# Patient Record
Sex: FEMALE | Race: OTHER | NOT HISPANIC OR LATINO | URBAN - METROPOLITAN AREA
[De-identification: names, ages, dates, MRNs, and addresses within clinical notes are randomized per-mention and may not be internally consistent; named-entity substitution may affect disease eponyms.]

---

## 2017-09-08 ENCOUNTER — INPATIENT (INPATIENT)
Facility: HOSPITAL | Age: 52
LOS: 6 days | Discharge: HOME | End: 2017-09-15
Attending: INTERNAL MEDICINE | Admitting: INTERNAL MEDICINE
Payer: MEDICAID

## 2017-09-08 VITALS
TEMPERATURE: 98 F | DIASTOLIC BLOOD PRESSURE: 97 MMHG | SYSTOLIC BLOOD PRESSURE: 133 MMHG | HEART RATE: 83 BPM | RESPIRATION RATE: 16 BRPM | OXYGEN SATURATION: 100 %

## 2017-09-08 DIAGNOSIS — Z29.9 ENCOUNTER FOR PROPHYLACTIC MEASURES, UNSPECIFIED: ICD-10-CM

## 2017-09-08 DIAGNOSIS — R74.8 ABNORMAL LEVELS OF OTHER SERUM ENZYMES: ICD-10-CM

## 2017-09-08 DIAGNOSIS — R06.02 SHORTNESS OF BREATH: ICD-10-CM

## 2017-09-08 DIAGNOSIS — R06.09 OTHER FORMS OF DYSPNEA: ICD-10-CM

## 2017-09-08 LAB
ALBUMIN SERPL ELPH-MCNC: 3.8 G/DL — SIGNIFICANT CHANGE UP (ref 3.3–5)
ALP SERPL-CCNC: 80 U/L — SIGNIFICANT CHANGE UP (ref 40–120)
ALT FLD-CCNC: 46 U/L — HIGH (ref 4–33)
APTT BLD: 36.1 SEC — SIGNIFICANT CHANGE UP (ref 27.5–37.4)
AST SERPL-CCNC: 39 U/L — HIGH (ref 4–32)
B PERT DNA SPEC QL NAA+PROBE: SIGNIFICANT CHANGE UP
BASE EXCESS BLDV CALC-SCNC: 3.5 MMOL/L — SIGNIFICANT CHANGE UP
BASOPHILS # BLD AUTO: 0.03 K/UL — SIGNIFICANT CHANGE UP (ref 0–0.2)
BASOPHILS NFR BLD AUTO: 0.4 % — SIGNIFICANT CHANGE UP (ref 0–2)
BILIRUB SERPL-MCNC: 0.4 MG/DL — SIGNIFICANT CHANGE UP (ref 0.2–1.2)
BLOOD GAS VENOUS - CREATININE: 0.6 MG/DL — SIGNIFICANT CHANGE UP (ref 0.5–1.3)
BUN SERPL-MCNC: 10 MG/DL — SIGNIFICANT CHANGE UP (ref 7–23)
C PNEUM DNA SPEC QL NAA+PROBE: NOT DETECTED — SIGNIFICANT CHANGE UP
CALCIUM SERPL-MCNC: 9.7 MG/DL — SIGNIFICANT CHANGE UP (ref 8.4–10.5)
CHLORIDE BLDV-SCNC: 105 MMOL/L — SIGNIFICANT CHANGE UP (ref 96–108)
CHLORIDE SERPL-SCNC: 102 MMOL/L — SIGNIFICANT CHANGE UP (ref 98–107)
CK MB BLD-MCNC: 4.9 — HIGH (ref 0–2.5)
CK MB BLD-MCNC: 55.11 NG/ML — HIGH (ref 1–4.7)
CK MB BLD-MCNC: 58.93 NG/ML — HIGH (ref 1–4.7)
CK SERPL-CCNC: 1143 U/L — HIGH (ref 25–170)
CK SERPL-CCNC: 1200 U/L — HIGH (ref 25–170)
CO2 SERPL-SCNC: 26 MMOL/L — SIGNIFICANT CHANGE UP (ref 22–31)
CREAT SERPL-MCNC: 0.56 MG/DL — SIGNIFICANT CHANGE UP (ref 0.5–1.3)
EOSINOPHIL # BLD AUTO: 0 K/UL — SIGNIFICANT CHANGE UP (ref 0–0.5)
EOSINOPHIL NFR BLD AUTO: 0 % — SIGNIFICANT CHANGE UP (ref 0–6)
FLUAV H1 2009 PAND RNA SPEC QL NAA+PROBE: NOT DETECTED — SIGNIFICANT CHANGE UP
FLUAV H1 RNA SPEC QL NAA+PROBE: NOT DETECTED — SIGNIFICANT CHANGE UP
FLUAV H3 RNA SPEC QL NAA+PROBE: NOT DETECTED — SIGNIFICANT CHANGE UP
FLUAV SUBTYP SPEC NAA+PROBE: SIGNIFICANT CHANGE UP
FLUBV RNA SPEC QL NAA+PROBE: NOT DETECTED — SIGNIFICANT CHANGE UP
GAS PNL BLDV: 138 MMOL/L — SIGNIFICANT CHANGE UP (ref 136–146)
GLUCOSE BLDV-MCNC: 111 — HIGH (ref 70–99)
GLUCOSE SERPL-MCNC: 102 MG/DL — HIGH (ref 70–99)
HADV DNA SPEC QL NAA+PROBE: NOT DETECTED — SIGNIFICANT CHANGE UP
HCG SERPL-ACNC: < 5 MIU/ML — SIGNIFICANT CHANGE UP
HCO3 BLDV-SCNC: 25 MMOL/L — SIGNIFICANT CHANGE UP (ref 20–27)
HCOV 229E RNA SPEC QL NAA+PROBE: NOT DETECTED — SIGNIFICANT CHANGE UP
HCOV HKU1 RNA SPEC QL NAA+PROBE: NOT DETECTED — SIGNIFICANT CHANGE UP
HCOV NL63 RNA SPEC QL NAA+PROBE: NOT DETECTED — SIGNIFICANT CHANGE UP
HCOV OC43 RNA SPEC QL NAA+PROBE: NOT DETECTED — SIGNIFICANT CHANGE UP
HCT VFR BLD CALC: 45.8 % — HIGH (ref 34.5–45)
HCT VFR BLDV CALC: 46 % — HIGH (ref 34.5–45)
HGB BLD-MCNC: 14.5 G/DL — SIGNIFICANT CHANGE UP (ref 11.5–15.5)
HGB BLDV-MCNC: 15 G/DL — SIGNIFICANT CHANGE UP (ref 11.5–15.5)
HMPV RNA SPEC QL NAA+PROBE: NOT DETECTED — SIGNIFICANT CHANGE UP
HPIV1 RNA SPEC QL NAA+PROBE: NOT DETECTED — SIGNIFICANT CHANGE UP
HPIV2 RNA SPEC QL NAA+PROBE: NOT DETECTED — SIGNIFICANT CHANGE UP
HPIV3 RNA SPEC QL NAA+PROBE: NOT DETECTED — SIGNIFICANT CHANGE UP
HPIV4 RNA SPEC QL NAA+PROBE: NOT DETECTED — SIGNIFICANT CHANGE UP
IMM GRANULOCYTES # BLD AUTO: 0.03 # — SIGNIFICANT CHANGE UP
IMM GRANULOCYTES NFR BLD AUTO: 0.4 % — SIGNIFICANT CHANGE UP (ref 0–1.5)
INR BLD: 1.06 — SIGNIFICANT CHANGE UP (ref 0.88–1.17)
LACTATE BLDV-MCNC: 1.6 MMOL/L — SIGNIFICANT CHANGE UP (ref 0.5–2)
LYMPHOCYTES # BLD AUTO: 1.19 K/UL — SIGNIFICANT CHANGE UP (ref 1–3.3)
LYMPHOCYTES # BLD AUTO: 14.2 % — SIGNIFICANT CHANGE UP (ref 13–44)
M PNEUMO DNA SPEC QL NAA+PROBE: NOT DETECTED — SIGNIFICANT CHANGE UP
MCHC RBC-ENTMCNC: 26.2 PG — LOW (ref 27–34)
MCHC RBC-ENTMCNC: 31.7 % — LOW (ref 32–36)
MCV RBC AUTO: 82.7 FL — SIGNIFICANT CHANGE UP (ref 80–100)
MONOCYTES # BLD AUTO: 0.6 K/UL — SIGNIFICANT CHANGE UP (ref 0–0.9)
MONOCYTES NFR BLD AUTO: 7.1 % — SIGNIFICANT CHANGE UP (ref 2–14)
NEUTROPHILS # BLD AUTO: 6.55 K/UL — SIGNIFICANT CHANGE UP (ref 1.8–7.4)
NEUTROPHILS NFR BLD AUTO: 77.9 % — HIGH (ref 43–77)
NRBC # FLD: 0 — SIGNIFICANT CHANGE UP
NT-PROBNP SERPL-SCNC: 50.39 PG/ML — SIGNIFICANT CHANGE UP
PCO2 BLDV: 56 MMHG — HIGH (ref 41–51)
PH BLDV: 7.33 PH — SIGNIFICANT CHANGE UP (ref 7.32–7.43)
PLATELET # BLD AUTO: 193 K/UL — SIGNIFICANT CHANGE UP (ref 150–400)
PMV BLD: 11 FL — SIGNIFICANT CHANGE UP (ref 7–13)
PO2 BLDV: < 24 MMHG — LOW (ref 35–40)
POTASSIUM BLDV-SCNC: 4.1 MMOL/L — SIGNIFICANT CHANGE UP (ref 3.4–4.5)
POTASSIUM SERPL-MCNC: 4.2 MMOL/L — SIGNIFICANT CHANGE UP (ref 3.5–5.3)
POTASSIUM SERPL-SCNC: 4.2 MMOL/L — SIGNIFICANT CHANGE UP (ref 3.5–5.3)
PROT SERPL-MCNC: 7.7 G/DL — SIGNIFICANT CHANGE UP (ref 6–8.3)
PROTHROM AB SERPL-ACNC: 11.9 SEC — SIGNIFICANT CHANGE UP (ref 9.8–13.1)
RBC # BLD: 5.54 M/UL — HIGH (ref 3.8–5.2)
RBC # FLD: 14.5 % — SIGNIFICANT CHANGE UP (ref 10.3–14.5)
RSV RNA SPEC QL NAA+PROBE: NOT DETECTED — SIGNIFICANT CHANGE UP
RV+EV RNA SPEC QL NAA+PROBE: NOT DETECTED — SIGNIFICANT CHANGE UP
SAO2 % BLDV: 27.4 % — LOW (ref 60–85)
SODIUM SERPL-SCNC: 141 MMOL/L — SIGNIFICANT CHANGE UP (ref 135–145)
TROPONIN T SERPL-MCNC: 0.17 NG/ML — HIGH (ref 0–0.06)
TROPONIN T SERPL-MCNC: 0.19 NG/ML — HIGH (ref 0–0.06)
WBC # BLD: 8.4 K/UL — SIGNIFICANT CHANGE UP (ref 3.8–10.5)
WBC # FLD AUTO: 8.4 K/UL — SIGNIFICANT CHANGE UP (ref 3.8–10.5)

## 2017-09-08 PROCEDURE — 71250 CT THORAX DX C-: CPT | Mod: 26

## 2017-09-08 PROCEDURE — 71020: CPT | Mod: 26

## 2017-09-08 PROCEDURE — 99255 IP/OBS CONSLTJ NEW/EST HI 80: CPT | Mod: GC

## 2017-09-08 RX ORDER — HEPARIN SODIUM 5000 [USP'U]/ML
3800 INJECTION INTRAVENOUS; SUBCUTANEOUS ONCE
Qty: 0 | Refills: 0 | Status: COMPLETED | OUTPATIENT
Start: 2017-09-08 | End: 2017-09-08

## 2017-09-08 RX ORDER — ASPIRIN/CALCIUM CARB/MAGNESIUM 324 MG
81 TABLET ORAL DAILY
Qty: 0 | Refills: 0 | Status: DISCONTINUED | OUTPATIENT
Start: 2017-09-09 | End: 2017-09-15

## 2017-09-08 RX ORDER — HEPARIN SODIUM 5000 [USP'U]/ML
INJECTION INTRAVENOUS; SUBCUTANEOUS
Qty: 25000 | Refills: 0 | Status: DISCONTINUED | OUTPATIENT
Start: 2017-09-08 | End: 2017-09-08

## 2017-09-08 RX ORDER — ASPIRIN/CALCIUM CARB/MAGNESIUM 324 MG
325 TABLET ORAL ONCE
Qty: 0 | Refills: 0 | Status: COMPLETED | OUTPATIENT
Start: 2017-09-08 | End: 2017-09-08

## 2017-09-08 RX ORDER — HEPARIN SODIUM 5000 [USP'U]/ML
3800 INJECTION INTRAVENOUS; SUBCUTANEOUS EVERY 6 HOURS
Qty: 0 | Refills: 0 | Status: DISCONTINUED | OUTPATIENT
Start: 2017-09-08 | End: 2017-09-08

## 2017-09-08 RX ADMIN — Medication 325 MILLIGRAM(S): at 10:50

## 2017-09-08 RX ADMIN — HEPARIN SODIUM 750 UNIT(S)/HR: 5000 INJECTION INTRAVENOUS; SUBCUTANEOUS at 10:50

## 2017-09-08 RX ADMIN — HEPARIN SODIUM 3800 UNIT(S): 5000 INJECTION INTRAVENOUS; SUBCUTANEOUS at 10:50

## 2017-09-08 NOTE — H&P ADULT - NSHPSOCIALHISTORY_GEN_ALL_CORE
Patient lives at home with family, house wife. Denies current or former tobacco use, alcohol consumption, or illicit drug use.

## 2017-09-08 NOTE — H&P ADULT - HISTORY OF PRESENT ILLNESS
51 y/o female presents to the ED with shortness of breath. The patient reports that she has been experiencing worsening PYLE for the past 2 weeks. The PYLE somewhat subsides with rest and is associated with PND and 2-pillow orthopnea. At baseline, the patient has always been able to walk for several blocks without developing symptoms, but can only walk a half block now before feeling SOB. Dyspnea has been accompanied by a persistent cough that is productive of white sputum and worse during exertion. Tussive episodes are painful and cause a dull pain on the left side of her neck and mid-sternal chest that subsides with resolution of coughing. She has also had an intermittent, bitemporal HA for the past 2-3 weeks that is a 9/10 in severity and is worse with tussive episodes. The patient also reports that she has felt a "fluttering sensation" in her mid-sternal chest for the past 2 weeks that has been constant. She notes having a decreased appetite and feeling more fatigued over the past 2 weeks, but denies any sick contacts or recent travel. She does not have a PCP or cardiologist and has not seen a doctor in many years. The patient denies dizziness, numbness/tingling, weakness, syncope, diaphoresis, lower extremity edema, changes in weight, dysuria, melena, BRBPR, abdominal pain, constipation, and N/V/D.

## 2017-09-08 NOTE — H&P ADULT - PROBLEM SELECTOR PLAN 1
Admitted to telemetry. Check serial EKGs, FLP, A1C, and TSH. Heparin gtt. Check Echo. F/U Cardiology note

## 2017-09-08 NOTE — ED PROCEDURE NOTE - PROCEDURE ADDITIONAL DETAILS
nl LV systolic function,   likely grade 1 diastolic dysfunction,  no pericardial or pleural effusion seen,  AV,MV grosly nl,  RV not dilated, TAPSE nl (18)   lung fields 1-2 B lines per field but thickened pleura suggestive of interstitial process

## 2017-09-08 NOTE — H&P ADULT - ASSESSMENT
51 y/o female presents to the ED with shortness of breath is admitted to telemetry for positive cardiac enzymes r/o ACS.

## 2017-09-08 NOTE — H&P ADULT - RS GEN PE MLT RESP DETAILS PC
respirations non-labored/airway patent/no rhonchi/rales/breath sounds equal/good air movement/no wheezes/no chest wall tenderness

## 2017-09-08 NOTE — ED PROVIDER NOTE - OBJECTIVE STATEMENT
51 y/o female with no pmhx nonsmoker presents to ED for SOB and cough x 2 weeks. Endorsing subjective fevers, productive cough with white sputum associated with cramping chest pain only when coughing. Nonpleuritic, nonradiating. Admits to PYLE, cannot walk a block without becoming SOB. No family hx of CAD, MI or PE. Does not have a pcp nor cardiologist. No recent travel or sick contacts. No night sweats, palpitations, abdominal pain, n/v/d, weakness, syncope, numbness, LE edema, calf tenderness, estrogen use, surgeries.

## 2017-09-08 NOTE — H&P ADULT - NEGATIVE MUSCULOSKELETAL SYMPTOMS
no arthritis/no muscle cramps/no myalgia/no neck pain/no arthralgia/no joint swelling/no muscle weakness/no back pain

## 2017-09-08 NOTE — ED PROVIDER NOTE - PROGRESS NOTE DETAILS
GAETANO Jimenez - as per Dr. Corea, bedside sono reveals normal systolic function, mild grade I diastolic dysfunction. GAETANO Jimenez - discussed results with pt, amenable for admission and IV heparin for possible NSTEMI. will admit to tele doc of day given no need for emergent cath. spoke with Dr. Yoder.

## 2017-09-08 NOTE — CONSULT NOTE ADULT - ASSESSMENT
Ms. Manley is a 52 year old woman with no PMHx, presents with worsening PYLE for 2 wks with cough.     Found to have  hands, Gottron's papules, heliotrope rash, with elevation of CK and NSIP pattern on CT chest. 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 The only thing she does not endorse is arthralgias, muscle pain/weakness, polyarthritis. Low suspicion for any cardiac etiology. Even though she is on room air right now, worried that given the acute worsening of her respiratory symptoms, she may be in an exacerbation of her ILD. Possible superimposed infection.     - please KALE, anti-CCP, RF, aldolase, anti-Salima-1, anti-dsDNA, anti-Sm, anti-RNP, scleroderma ab, sjorgen's ab.   - check RVP   - check sputum cx and blood cx  - check legionella   - suggest giving her vancomycin / zosyn after cultures.   - will consider giving empiric steroids if infectious w/u comes back negative.   - consult rheumatologist.   - keep SaO2 > 92%.     follow up the completion of this note    will discuss with the attending Ms. Manley is a 52 year old woman with no PMHx, presents with worsening PYLE for 2 wks with cough.     Found to have  hands, Gottron's papules, heliotrope rash, with elevation of CK and NSIP pattern on CT chest. 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 The only thing she does not endorse is arthralgias, muscle pain/weakness, polyarthritis. Low suspicion for any cardiac etiology. Even though she is on room air right now, worried that given the acute worsening of her respiratory symptoms, she may be in an exacerbation of her ILD. Possible superimposed infection.     - please KALE, anti-CCP, RF, aldolase, anti-Salima-1, anti-dsDNA, anti-Sm, anti-RNP, scleroderma ab, sjorgen's ab.   - check RVP   - check sputum cx and blood cx  - check legionella   - suggest giving her vancomycin / zosyn after cultures.   - will consider giving empiric steroids if infectious w/u comes back negative.   - consult rheumatologist.   - keep SaO2 > 92%.   - discuss with cards to consider stopping the hep gtt, low suspicion for ACS.   - will transfer to [facility] when a bed becomes available.     follow up the completion of this note  will discuss with the attending Ms. Manley is a 52 year old woman with no PMHx, presents with worsening PYLE for 2 wks with cough.     Found to have  hands, Gottron's papules, heliotrope rash, with elevation of CK and NSIP pattern on CT chest. 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 The only thing she does not endorse is arthralgias, muscle pain/weakness, polyarthritis. Low suspicion for any cardiac etiology. Even though she is on room air right now, worried that given the acute worsening of her respiratory symptoms, she may be in an exacerbation of her ILD. Possible superimposed infection.     - please KALE, anti-CCP, RF, aldolase, anti-Salima-1, anti-dsDNA, anti-Sm, anti-RNP, scleroderma ab, sjorgen's ab.   - check RVP   - check sputum cx and blood cx  - check legionella   - start vancomycin / zosyn / azithromycin after cultures.   - will consider giving empiric pulse dose steroids if infectious w/u comes back negative.   - consult rheumatologist.   - keep SaO2 > 92%.   - low suspicion of ACS - would stop the hep gtt.   - will transfer to [facility] when a bed becomes available.     Jed Nowak MD   Pulmonary Fellow

## 2017-09-08 NOTE — CONSULT NOTE ADULT - SUBJECTIVE AND OBJECTIVE BOX
CHIEF COMPLAINT: shortness of breath and cough     HPI: Ms. Manley is a 52 year old woman with no PMHx, presents with shortness of breath for 2 wks.     +cough -- worsening SOB.   +PYLE   +bitemporal HA  +neck pain with coughing  no fevers / chills / N/V    PAST MEDICAL & SURGICAL HISTORY:  No pertinent past medical history  No significant past surgical history      FAMILY HISTORY:  No pertinent family history in first degree relatives      SOCIAL HISTORY:  Smoking: __ packs x ___ years  EtOH Use:  Marital Status:  Occupation:  Recent Travel:  Country of Birth:  Advance Directives:    Allergies  No Known Allergies    HOME MEDICATIONS:    REVIEW OF SYSTEMS:  [x] All other systems negative  [ ] Unable to assess ROS because ________    OBJECTIVE:  ICU Vital Signs Last 24 Hrs  T(C): 36.7 (08 Sep 2017 07:24), Max: 36.7 (08 Sep 2017 06:29)  T(F): 98.1 (08 Sep 2017 07:24), Max: 98.1 (08 Sep 2017 07:24)  HR: 80 (08 Sep 2017 11:30) (79 - 83)  BP: 122/90 (08 Sep 2017 11:30) (116/90 - 133/97)  RR: 22 (08 Sep 2017 11:30) (16 - 22)  SpO2: 100% (08 Sep 2017 11:30) (100% - 100%)    PHYSICAL EXAM:  General:   HEENT:   Lymph Nodes:  Neck:   Respiratory:   Cardiovascular:   Abdomen:   Extremities:   Skin:   Neurological:  Psychiatry:    HOSPITAL MEDICATIONS:  MEDICATIONS  (STANDING):  heparin  Infusion.  Unit(s)/Hr (7.5 mL/Hr) IV Continuous <Continuous>    MEDICATIONS  (PRN):  heparin  Injectable 3800 Unit(s) IV Push every 6 hours PRN For aPTT less than 40      LABS:                        14.5   8.40  )-----------( 193      ( 08 Sep 2017 08:00 )             45.8     09-08    141  |  102  |  10  ----------------------------<  102<H>  4.2   |  26  |  0.56    Ca    9.7      08 Sep 2017 08:00    TPro  7.7  /  Alb  3.8  /  TBili  0.4  /  DBili  x   /  AST  39<H>  /  ALT  46<H>  /  AlkPhos  80  09-08    PT/INR - ( 08 Sep 2017 11:03 )   PT: 11.9 SEC;   INR: 1.06          PTT - ( 08 Sep 2017 11:03 )  PTT:36.1 SEC      Venous Blood Gas:  09-08 @ 08:00  7.33/56/< 24/25/27.4  VBG Lactate: 1.6    RADIOLOGY:  CT Thorax:   1.Interstitial lung disease with traction bronchiectasis more prominent at  the lung bases is suggestive of pulmonary fibrosis. The above findings are  suggestive of nonspecific interstitial pneumonitis (NSIP) pattern.  2. Mediastinal adenopathy secondary to pulmonary fibrosis.  3. 1.8 cm indeterminate hypodense lesion in the left lobe of liver. Further  evaluation with MRI is suggested. CHIEF COMPLAINT: shortness of breath and cough     HPI: Ms. Manley is a 52 year old woman with no PMHx, presents with shortness of breath for 2 wks.     For the last 2 wks her PYLE has significantly worsened. She gets SOB while walking 1/2 block and 1 flight of stairs. Her dyspnea first started approx 6 month ago. But for the last 2 wks, it has progressively worsening. +cough -- productive. +fevers / chills -- but did not measure her temp. During this time, she has also developed a hyperpigmented rash on her knuckles and also a rash around her eyes.     No N/V. She does not work -- stays mostly at home. Lives on the first floor. Never smoked. No sick contacts. She is from [facility], but came her a long time ago. No exposures to any mold or chemicals.     No previous medical hx. Does not see any doctors. Not on any medications.     No family hx of any autoimmune disease or any lung disease.     PAST MEDICAL & SURGICAL HISTORY:  No pertinent past medical history  No significant past surgical history      FAMILY HISTORY:  No pertinent family history in first degree relatives      SOCIAL HISTORY:  Smoking: none  EtOH Use: none  Marital Status:   Occupation: stays at home  Recent Travel: none  Country of Birth: [facility]  Advance Directives: full code    Allergies  No Known Allergies    HOME MEDICATIONS:    REVIEW OF SYSTEMS:  [x] All other systems negative  [ ] Unable to assess ROS because ________    OBJECTIVE:  ICU Vital Signs Last 24 Hrs  T(C): 36.7 (08 Sep 2017 07:24), Max: 36.7 (08 Sep 2017 06:29)  T(F): 98.1 (08 Sep 2017 07:24), Max: 98.1 (08 Sep 2017 07:24)  HR: 80 (08 Sep 2017 11:30) (79 - 83)  BP: 122/90 (08 Sep 2017 11:30) (116/90 - 133/97)  RR: 22 (08 Sep 2017 11:30) (16 - 22)  SpO2: 100% (08 Sep 2017 11:30) (100% - 100%)    PHYSICAL EXAM:  General: in mild respiratory distress. tachypneic to the high 20s - low 30s. pleasant.   HEENT: clear OP.  Neck: no  cervical LAD.   Respiratory: inspiratory crackles posteriorly. no wheezing.   Cardiovascular: nl rate and reg rhythm. nl s1, s2. no m/r/g.  Abdomen: soft. nt. nd. +bs  Extremities: + hands. no clubbing.  Skin: +hyperpigmented rash on her knuckles b/l -- consistent with Gottron's papules. + hands.   Neurological: AOx3.   Psychiatry: none    HOSPITAL MEDICATIONS:  MEDICATIONS  (STANDING):  heparin  Infusion.  Unit(s)/Hr (7.5 mL/Hr) IV Continuous <Continuous>    MEDICATIONS  (PRN):  heparin  Injectable 3800 Unit(s) IV Push every 6 hours PRN For aPTT less than 40      LABS:                        14.5   8.40  )-----------( 193      ( 08 Sep 2017 08:00 )             45.8     09-08    141  |  102  |  10  ----------------------------<  102<H>  4.2   |  26  |  0.56    Ca    9.7      08 Sep 2017 08:00    TPro  7.7  /  Alb  3.8  /  TBili  0.4  /  DBili  x   /  AST  39<H>  /  ALT  46<H>  /  AlkPhos  80  09-08    PT/INR - ( 08 Sep 2017 11:03 )   PT: 11.9 SEC;   INR: 1.06          PTT - ( 08 Sep 2017 11:03 )  PTT:36.1 SEC      Venous Blood Gas:  09-08 @ 08:00  7.33/56/< 24/25/27.4  VBG Lactate: 1.6    CK: 1100    RADIOLOGY:  CT Thorax:   1.Interstitial lung disease with traction bronchiectasis more prominent at  the lung bases is suggestive of pulmonary fibrosis. The above findings are  suggestive of nonspecific interstitial pneumonitis (NSIP) pattern.  2. Mediastinal adenopathy secondary to pulmonary fibrosis.  3. 1.8 cm indeterminate hypodense lesion in the left lobe of liver. Further  evaluation with MRI is suggested.

## 2017-09-08 NOTE — H&P ADULT - ATTENDING COMMENTS
1) Pulmonary fibrosis - possible NSIP, f/u rheumatology work up, f/u pulmonary r/o dermatomyosistis    2) Liver mass - consider GI consult     3) Elevated troponins - unlikely ACS D/C HEPARIN, f/u 2d echo

## 2017-09-08 NOTE — H&P ADULT - NEGATIVE NEUROLOGICAL SYMPTOMS
no transient paralysis/no paresthesias/no weakness/no loss of consciousness/no syncope/no generalized seizures/no vertigo/no loss of sensation

## 2017-09-08 NOTE — H&P ADULT - MUSCULOSKELETAL
details… detailed exam normal strength/no joint swelling/no joint erythema/ROM intact/no calf tenderness/no joint warmth

## 2017-09-08 NOTE — H&P ADULT - NEUROLOGICAL DETAILS
alert and oriented x 3/responds to verbal commands/responds to pain/cranial nerves intact/normal strength/sensation intact

## 2017-09-08 NOTE — ED PROVIDER NOTE - MEDICAL DECISION MAKING DETAILS
51 y/o female with no pmhx nonsmoker presents to ED for SOB and cough x 2 weeks. plan: bedside sono, BNP, CE, hcg, cxr, ekg, rvp

## 2017-09-08 NOTE — ED PROVIDER NOTE - ATTENDING CONTRIBUTION TO CARE
[place]  pt w/o significant PMH  notes 2 weeks of cough productive of white sputum  /subjective fever  progressive PYLE  also notes orthopnea   With PYLE notes pulling sensation in chest  denies recent travel or known environmental exposures  No h/o prior sxs of this type  exam  Pt in no distress at rest  sat upper 90s RA  no JVD noted   card S1S2  no gallop  no significant murmur  abd soft nontender no palpable organomegaly  no LE edema no cyanosis   lungs with dry rales  both bases and anteriorly  dull at bases  Imp  exam most suggestive of interstitial process   obtain full labs  viral panel  CXR  consider CT  admit for further evaluation

## 2017-09-08 NOTE — ED PROVIDER NOTE - ENMT, MLM
Airway patent, Nasal mucosa clear. Mouth with normal mucosa. Throat has no vesicles, no oropharyngeal exudates and uvula is midline. No LAD.

## 2017-09-08 NOTE — ED ADULT TRIAGE NOTE - CHIEF COMPLAINT QUOTE
c.o shortness of breath with exertion x2 weeks, worsening today. also c.o midsternal chest pain. denies pmh

## 2017-09-09 LAB
ALBUMIN SERPL ELPH-MCNC: 3.5 G/DL — SIGNIFICANT CHANGE UP (ref 3.3–5)
ALP SERPL-CCNC: 77 U/L — SIGNIFICANT CHANGE UP (ref 40–120)
ALT FLD-CCNC: 41 U/L — HIGH (ref 4–33)
APPEARANCE UR: CLEAR — SIGNIFICANT CHANGE UP
AST SERPL-CCNC: 36 U/L — HIGH (ref 4–32)
BILIRUB SERPL-MCNC: 0.5 MG/DL — SIGNIFICANT CHANGE UP (ref 0.2–1.2)
BILIRUB UR-MCNC: NEGATIVE — SIGNIFICANT CHANGE UP
BLOOD UR QL VISUAL: NEGATIVE — SIGNIFICANT CHANGE UP
BUN SERPL-MCNC: 12 MG/DL — SIGNIFICANT CHANGE UP (ref 7–23)
CALCIUM SERPL-MCNC: 9.7 MG/DL — SIGNIFICANT CHANGE UP (ref 8.4–10.5)
CHLORIDE SERPL-SCNC: 101 MMOL/L — SIGNIFICANT CHANGE UP (ref 98–107)
CHOLEST SERPL-MCNC: 232 MG/DL — HIGH (ref 120–199)
CO2 SERPL-SCNC: 23 MMOL/L — SIGNIFICANT CHANGE UP (ref 22–31)
COLOR SPEC: SIGNIFICANT CHANGE UP
CREAT SERPL-MCNC: 0.67 MG/DL — SIGNIFICANT CHANGE UP (ref 0.5–1.3)
DSDNA AB FLD-ACNC: <0.2 ZZ — SIGNIFICANT CHANGE UP
ENA JO1 AB SER-ACNC: <0.2 ZZ — SIGNIFICANT CHANGE UP
ENA SS-A AB FLD IA-ACNC: 7.5 ZZ — HIGH
GLUCOSE SERPL-MCNC: 119 MG/DL — HIGH (ref 70–99)
GLUCOSE UR-MCNC: NEGATIVE — SIGNIFICANT CHANGE UP
HBA1C BLD-MCNC: 6.6 % — HIGH (ref 4–5.6)
HBV SURFACE AG SER-ACNC: NEGATIVE — SIGNIFICANT CHANGE UP
HCG UR-SCNC: NEGATIVE — SIGNIFICANT CHANGE UP
HDLC SERPL-MCNC: 27 MG/DL — LOW (ref 45–65)
IGA FLD-MCNC: 306 MG/DL — SIGNIFICANT CHANGE UP (ref 70–400)
IGG FLD-MCNC: 1646 MG/DL — HIGH (ref 700–1600)
IGM SERPL-MCNC: 120 MG/DL — SIGNIFICANT CHANGE UP (ref 40–230)
KETONES UR-MCNC: NEGATIVE — SIGNIFICANT CHANGE UP
LEUKOCYTE ESTERASE UR-ACNC: NEGATIVE — SIGNIFICANT CHANGE UP
LIPID PNL WITH DIRECT LDL SERPL: 149 MG/DL — SIGNIFICANT CHANGE UP
MAGNESIUM SERPL-MCNC: 1.8 MG/DL — SIGNIFICANT CHANGE UP (ref 1.6–2.6)
MUCOUS THREADS # UR AUTO: SIGNIFICANT CHANGE UP
NITRITE UR-MCNC: NEGATIVE — SIGNIFICANT CHANGE UP
PH UR: 6 — SIGNIFICANT CHANGE UP (ref 4.6–8)
PHOSPHATE SERPL-MCNC: 4 MG/DL — SIGNIFICANT CHANGE UP (ref 2.5–4.5)
POTASSIUM SERPL-MCNC: 4.4 MMOL/L — SIGNIFICANT CHANGE UP (ref 3.5–5.3)
POTASSIUM SERPL-SCNC: 4.4 MMOL/L — SIGNIFICANT CHANGE UP (ref 3.5–5.3)
PROT SERPL-MCNC: 7.4 G/DL — SIGNIFICANT CHANGE UP (ref 6–8.3)
PROT UR-MCNC: 2.4 MG/DL — SIGNIFICANT CHANGE UP
PROT UR-MCNC: NEGATIVE — SIGNIFICANT CHANGE UP
RBC CASTS # UR COMP ASSIST: SIGNIFICANT CHANGE UP (ref 0–?)
RHEUMATOID FACT SERPL-ACNC: 9.9 IU/ML — SIGNIFICANT CHANGE UP
SODIUM SERPL-SCNC: 137 MMOL/L — SIGNIFICANT CHANGE UP (ref 135–145)
SP GR SPEC: 1.01 — SIGNIFICANT CHANGE UP (ref 1–1.03)
SQUAMOUS # UR AUTO: SIGNIFICANT CHANGE UP
TRIGL SERPL-MCNC: 502 MG/DL — HIGH (ref 10–149)
UROBILINOGEN FLD QL: NORMAL E.U. — SIGNIFICANT CHANGE UP (ref 0.1–0.2)
WBC UR QL: SIGNIFICANT CHANGE UP (ref 0–?)

## 2017-09-09 PROCEDURE — 99255 IP/OBS CONSLTJ NEW/EST HI 80: CPT | Mod: GC

## 2017-09-09 PROCEDURE — 84166 PROTEIN E-PHORESIS/URINE/CSF: CPT | Mod: 26

## 2017-09-09 PROCEDURE — 99233 SBSQ HOSP IP/OBS HIGH 50: CPT | Mod: GC

## 2017-09-09 RX ORDER — VANCOMYCIN HCL 1 G
1000 VIAL (EA) INTRAVENOUS EVERY 12 HOURS
Qty: 0 | Refills: 0 | Status: DISCONTINUED | OUTPATIENT
Start: 2017-09-10 | End: 2017-09-10

## 2017-09-09 RX ORDER — VANCOMYCIN HCL 1 G
VIAL (EA) INTRAVENOUS
Qty: 0 | Refills: 0 | Status: DISCONTINUED | OUTPATIENT
Start: 2017-09-09 | End: 2017-09-10

## 2017-09-09 RX ORDER — AZITHROMYCIN 500 MG/1
500 TABLET, FILM COATED ORAL DAILY
Qty: 0 | Refills: 0 | Status: DISCONTINUED | OUTPATIENT
Start: 2017-09-09 | End: 2017-09-11

## 2017-09-09 RX ORDER — SIMETHICONE 80 MG/1
80 TABLET, CHEWABLE ORAL EVERY 6 HOURS
Qty: 0 | Refills: 0 | Status: DISCONTINUED | OUTPATIENT
Start: 2017-09-09 | End: 2017-09-15

## 2017-09-09 RX ORDER — ACETAMINOPHEN 500 MG
650 TABLET ORAL EVERY 6 HOURS
Qty: 0 | Refills: 0 | Status: DISCONTINUED | OUTPATIENT
Start: 2017-09-09 | End: 2017-09-15

## 2017-09-09 RX ORDER — VANCOMYCIN HCL 1 G
1000 VIAL (EA) INTRAVENOUS ONCE
Qty: 0 | Refills: 0 | Status: COMPLETED | OUTPATIENT
Start: 2017-09-09 | End: 2017-09-09

## 2017-09-09 RX ORDER — PIPERACILLIN AND TAZOBACTAM 4; .5 G/20ML; G/20ML
3.38 INJECTION, POWDER, LYOPHILIZED, FOR SOLUTION INTRAVENOUS EVERY 8 HOURS
Qty: 0 | Refills: 0 | Status: DISCONTINUED | OUTPATIENT
Start: 2017-09-09 | End: 2017-09-11

## 2017-09-09 RX ADMIN — Medication 250 MILLIGRAM(S): at 22:32

## 2017-09-09 RX ADMIN — Medication 81 MILLIGRAM(S): at 11:07

## 2017-09-09 RX ADMIN — Medication 250 MILLIGRAM(S): at 09:55

## 2017-09-09 RX ADMIN — Medication 50 MILLIGRAM(S): at 18:50

## 2017-09-09 RX ADMIN — PIPERACILLIN AND TAZOBACTAM 25 GRAM(S): 4; .5 INJECTION, POWDER, LYOPHILIZED, FOR SOLUTION INTRAVENOUS at 11:00

## 2017-09-09 RX ADMIN — PIPERACILLIN AND TAZOBACTAM 25 GRAM(S): 4; .5 INJECTION, POWDER, LYOPHILIZED, FOR SOLUTION INTRAVENOUS at 18:50

## 2017-09-09 RX ADMIN — Medication 650 MILLIGRAM(S): at 18:10

## 2017-09-09 RX ADMIN — Medication 250 MILLIGRAM(S): at 10:00

## 2017-09-09 RX ADMIN — SIMETHICONE 80 MILLIGRAM(S): 80 TABLET, CHEWABLE ORAL at 18:50

## 2017-09-09 RX ADMIN — Medication 650 MILLIGRAM(S): at 17:34

## 2017-09-09 RX ADMIN — AZITHROMYCIN 500 MILLIGRAM(S): 500 TABLET, FILM COATED ORAL at 09:55

## 2017-09-09 NOTE — PROGRESS NOTE ADULT - SUBJECTIVE AND OBJECTIVE BOX
CHIEF COMPLAINT:    Interval Events:    REVIEW OF SYSTEMS:  Constitutional:   Eyes:  ENT:  CV:  Resp:  GI:  :  MSK:  Integumentary:  Neurological:  Psychiatric:  Endocrine:  Hematologic/Lymphatic:  Allergic/Immunologic:  [ ] All other systems negative  [ ] Unable to assess ROS because ________    OBJECTIVE:  ICU Vital Signs Last 24 Hrs  T(C): 36.8 (08 Sep 2017 21:14), Max: 37 (08 Sep 2017 18:07)  T(F): 98.3 (08 Sep 2017 21:14), Max: 98.6 (08 Sep 2017 18:07)  HR: 91 (08 Sep 2017 21:14) (79 - 91)  BP: 123/73 (08 Sep 2017 21:14) (105/82 - 137/90)  BP(mean): --  ABP: --  ABP(mean): --  RR: 20 (08 Sep 2017 21:14) (20 - 22)  SpO2: 99% (08 Sep 2017 21:14) (96% - 100%)        CAPILLARY BLOOD GLUCOSE          PHYSICAL EXAM:  General:   HEENT:   Lymph Nodes:  Neck:   Respiratory:   Cardiovascular:   Abdomen:   Extremities:   Skin:   Neurological:  Psychiatry:    HOSPITAL MEDICATIONS:  MEDICATIONS  (STANDING):    MEDICATIONS  (PRN):      LABS:                        14.5   8.40  )-----------( 193      ( 08 Sep 2017 08:00 )             45.8     09-09    137  |  101  |  12  ----------------------------<  119<H>  4.4   |  23  |  0.67    Ca    9.7      09 Sep 2017 06:30  Phos  4.0     09-09  Mg     1.8     09-09    TPro  7.4  /  Alb  3.5  /  TBili  0.5  /  DBili  x   /  AST  36<H>  /  ALT  41<H>  /  AlkPhos  77  09-09    PT/INR - ( 08 Sep 2017 11:03 )   PT: 11.9 SEC;   INR: 1.06          PTT - ( 08 Sep 2017 11:03 )  PTT:36.1 SEC      Venous Blood Gas:  09-08 @ 08:00  7.33/56/< 24/25/27.4  VBG Lactate: 1.6      MICROBIOLOGY:     RADIOLOGY:  [ ] Reviewed and interpreted by me    PULMONARY FUNCTION TESTS:    EKG: CHIEF COMPLAINT: Patient is a 52y old  Female who presents with a chief complaint of Shortness of breath (08 Sep 2017 13:20)    Interval Events:  Admitted overnight    REVIEW OF SYSTEMS:  Constitutional: Feels the same  Eyes:  ENT:  CV: CP resolved  Resp: c/o cough, PYLE  GI:  :  MSK:  Integumentary:  Neurological:  Psychiatric:  Endocrine:  Hematologic/Lymphatic:  Allergic/Immunologic:  [x All other systems negative  [ ] Unable to assess ROS because ________    OBJECTIVE:  ICU Vital Signs Last 24 Hrs  T(C): 36.8 (08 Sep 2017 21:14), Max: 37 (08 Sep 2017 18:07)  T(F): 98.3 (08 Sep 2017 21:14), Max: 98.6 (08 Sep 2017 18:07)  HR: 91 (08 Sep 2017 21:14) (79 - 91)  BP: 123/73 (08 Sep 2017 21:14) (105/82 - 137/90)  BP(mean): --  ABP: --  ABP(mean): --  RR: 20 (08 Sep 2017 21:14) (20 - 22)  SpO2: 99% (08 Sep 2017 21:14) (96% - 100%)      HOSPITAL MEDICATIONS:  MEDICATIONS  (STANDING):    MEDICATIONS  (PRN):      LABS:                        14.5   8.40  )-----------( 193      ( 08 Sep 2017 08:00 )             45.8     09-09    137  |  101  |  12  ----------------------------<  119<H>  4.4   |  23  |  0.67    Ca    9.7      09 Sep 2017 06:30  Phos  4.0     09-09  Mg     1.8     09-09    TPro  7.4  /  Alb  3.5  /  TBili  0.5  /  DBili  x   /  AST  36<H>  /  ALT  41<H>  /  AlkPhos  77  09-09    PT/INR - ( 08 Sep 2017 11:03 )   PT: 11.9 SEC;   INR: 1.06          PTT - ( 08 Sep 2017 11:03 )  PTT:36.1 SEC      Venous Blood Gas:  09-08 @ 08:00  7.33/56/< 24/25/27.4  VBG Lactate: 1.6      MICROBIOLOGY:     RADIOLOGY:  [ ] Reviewed and interpreted by me    PULMONARY FUNCTION TESTS:    EKG:

## 2017-09-09 NOTE — PROGRESS NOTE ADULT - PROBLEM SELECTOR PLAN 1
Admitted to telemetry initially. Transferred to RCU. ACS ruled out. Dr. Alvarado from Cardiology will follow.

## 2017-09-09 NOTE — PROGRESS NOTE ADULT - ATTENDING COMMENTS
heliotrope rash around her eyes   hands  elevated CPK  CT scan suspicious for NSIP  will need rheumatologic work up -- consult pending  Pleuritic CP with cough somewhat better today

## 2017-09-09 NOTE — CONSULT NOTE ADULT - SUBJECTIVE AND OBJECTIVE BOX
ALIS SARAVIA  [id number]    HISTORY OF PRESENT ILLNESS: Rheumatology consulted for the evaluation of ILD in the setting of elevated CK and skin findings    Patient is a 51 yo W, with no known PMH ( hasn't seen a doctor in many years), presented for 6 months history of progressive worsening of dyspnea , markedly worse over the past 2 weeks, that occurs at minimal exertion, associated with non productive cough. Mild chest discomfort after coughing, subjective fever. Reports subjective weight loss.    Patient states that over the past year she noticed darkening of her skin over the knuckles, elbows and around the eyes.  No photosensitivity no other skin lesions.  endorses dry eyes, alopecia  No history of asthma or sinus infection, no hemoptysis, no arthralgias or arthritis, no history of miscarriages  denies any muscle weakness    No personal or familial history of auto-immune disease  Is not up-to-date with age appropriate screening            PAST MEDICAL & SURGICAL HISTORY:  No pertinent past medical history  No significant past surgical history      Review of Systems: as noted above     MEDICATIONS  (STANDING): Heparin, ASA          No Known Allergies          PERTINENT MEDICATION HISTORY: none     SOCIAL HISTORY: denies   OCCUPATION: none   TRAVEL HISTORY: none     FAMILY HISTORY:  No pertinent family history in first degree relatives      Vital Signs Last 24 Hrs  T(C): 36.8 (08 Sep 2017 21:14), Max: 37 (08 Sep 2017 18:07)  T(F): 98.3 (08 Sep 2017 21:14), Max: 98.6 (08 Sep 2017 18:07)  HR: 91 (08 Sep 2017 21:14) (79 - 91)  BP: 123/73 (08 Sep 2017 21:14) (105/82 - 137/90)  BP(mean): --  RR: 20 (08 Sep 2017 21:14) (20 - 22)  SpO2: 99% (08 Sep 2017 21:14) (96% - 100%)    Daily Height in cm: 157.48 (08 Sep 2017 13:20)    Daily     Physical Exam:  General: No apparent distress  HEENT: mild scleral icterus   CVS: +S1/S2, RRR,  Resp: left basilar crackles  GI: Soft, NT/ND +BS  MSK:  no evidence of joint deformities, no synovitis  normal ROM at all joints    uche-ungual hyperpigmentation  pitted nails  no telangiectasias    Neuro: AAOx3  muscle strength UE is 5/5 and LE 4/5   Skin:    hyperpigmented lesions over MCPs ( Gottron's) and over elbows  hyperpigmentation around the eyes  no skin lesions over the neck or back       LABS:                        14.5   8.40  )-----------( 193      ( 08 Sep 2017 08:00 )             45.8     09-09    137  |  101  |  12  ----------------------------<  119<H>  4.4   |  23  |  0.67    Ca    9.7      09 Sep 2017 06:30  Phos  4.0     09-09  Mg     1.8     09-09    TPro  7.4  /  Alb  3.5  /  TBili  0.5  /  DBili  x   /  AST  36 /  ALT  41 /  AlkPhos  77  09-09    CK 1200...1143    RF 9.9    Pending:  KALE  dsDNA  CCP  Sm  Sjogren's  Jo1      RADIOLOGY & ADDITIONAL STUDIES:   CT Chest No Cont (09.08.17 )  Interstitial lung disease with traction bronchiectasis more prominent   at the lung bases is suggestive of pulmonary fibrosis. The above findings   are suggestive of nonspecific interstitial pneumonitis (NSIP) pattern.  2. Mediastinal adenopathy secondary to pulmonary fibrosis.  3. 1.8 cm indeterminate hypodense lesion in the left lobe of liver. ALIS SARAVIA  [id number]    HISTORY OF PRESENT ILLNESS: Rheumatology consulted for the evaluation of ILD in the setting of elevated CK and skin findings    Patient is a 53 yo W, with no known PMH ( hasn't seen a doctor in many years), presented for 6 months history of progressive worsening of dyspnea , markedly worse over the past 2 weeks, that occurs at minimal exertion, associated with non productive cough. Mild chest discomfort after coughing, subjective fever. Reports subjective weight loss.    Patient states that over the past year she noticed darkening of her skin over the knuckles, elbows and around the eyes.  No photosensitivity no other skin lesions.  endorses dry eyes, alopecia  No history of asthma or sinus infection, no hemoptysis, no arthralgias or arthritis, no history of miscarriages  denies any muscle weakness    No personal or familial history of auto-immune disease  Is not up-to-date with age appropriate screening            PAST MEDICAL & SURGICAL HISTORY:  No pertinent past medical history  No significant past surgical history      Review of Systems: as noted above     MEDICATIONS  (STANDING): Heparin, ASA          No Known Allergies          PERTINENT MEDICATION HISTORY: none     SOCIAL HISTORY: denies   OCCUPATION: none   TRAVEL HISTORY: none     FAMILY HISTORY:  No pertinent family history in first degree relatives      Vital Signs Last 24 Hrs  T(C): 36.8 (08 Sep 2017 21:14), Max: 37 (08 Sep 2017 18:07)  T(F): 98.3 (08 Sep 2017 21:14), Max: 98.6 (08 Sep 2017 18:07)  HR: 91 (08 Sep 2017 21:14) (79 - 91)  BP: 123/73 (08 Sep 2017 21:14) (105/82 - 137/90)  BP(mean): --  RR: 20 (08 Sep 2017 21:14) (20 - 22)  SpO2: 99% (08 Sep 2017 21:14) (96% - 100%)    Daily Height in cm: 157.48 (08 Sep 2017 13:20)    Daily     Physical Exam:  General: No apparent distress  HEENT: mild scleral icterus   CVS: +S1/S2, RRR,  Resp: left basilar crackles  GI: Soft, NT/ND +BS  MSK:  no evidence of joint deformities, no synovitis  normal ROM at all joints    uhce-ungual hyperpigmentation  pitted nails  no telangiectasias    Neuro: AAOx3  muscle strength UE is 5/5 and LE 4/5   Skin:    hyperpigmented lesions over MCPs ( Gottron's) and over elbows  hyperpigmentation around the eyes  no skin lesions over the neck or back       LABS:                        14.5   8.40  )-----------( 193      ( 08 Sep 2017 08:00 )             45.8     09-09    137  |  101  |  12  ----------------------------<  119<H>  4.4   |  23  |  0.67    Ca    9.7      09 Sep 2017 06:30  Phos  4.0     09-09  Mg     1.8     09-09    TPro  7.4  /  Alb  3.5  /  TBili  0.5  /  DBili  x   /  AST  36 /  ALT  41 /  AlkPhos  77  09-09    CK 1200...1143    RF 9.9    Pending:  KALE  dsDNA  CCP  Sm  Sjogren's  Jo1  Aldolase  KALE  Scleroderma        RADIOLOGY & ADDITIONAL STUDIES:   CT Chest No Cont (09.08.17 )  Interstitial lung disease with traction bronchiectasis more prominent   at the lung bases is suggestive of pulmonary fibrosis. The above findings   are suggestive of nonspecific interstitial pneumonitis (NSIP) pattern.  2. Mediastinal adenopathy secondary to pulmonary fibrosis.  3. 1.8 cm indeterminate hypodense lesion in the left lobe of liver. ALIS SARAVIA  [id number]    HISTORY OF PRESENT ILLNESS: Rheumatology consulted for the evaluation of ILD in the setting of elevated CK and skin findings    Patient is a 53 yo W, with no known PMH ( hasn't seen a doctor in many years), presented for 6 months history of progressive worsening of dyspnea , markedly worse over the past 2 weeks, that occurs at minimal exertion, associated with non productive cough. Mild chest discomfort after coughing, subjective fever. Reports subjective weight loss.    Patient states that over the past year she noticed darkening of her skin over the knuckles, elbows and around the eyes.  No photosensitivity no other skin lesions.  endorses dry eyes, alopecia  No history of asthma or sinus infection, no hemoptysis, no arthralgias or arthritis, no history of miscarriages  no raynaud' s   denies any muscle weakness    No personal or familial history of auto-immune disease  Is not up-to-date with age appropriate screening            PAST MEDICAL & SURGICAL HISTORY:  No pertinent past medical history  No significant past surgical history      Review of Systems: as noted above     MEDICATIONS  (STANDING): Heparin, ASA          No Known Allergies          PERTINENT MEDICATION HISTORY: none     SOCIAL HISTORY: denies   OCCUPATION: none   TRAVEL HISTORY: none     FAMILY HISTORY:  No pertinent family history in first degree relatives      Vital Signs Last 24 Hrs  T(C): 36.8 (08 Sep 2017 21:14), Max: 37 (08 Sep 2017 18:07)  T(F): 98.3 (08 Sep 2017 21:14), Max: 98.6 (08 Sep 2017 18:07)  HR: 91 (08 Sep 2017 21:14) (79 - 91)  BP: 123/73 (08 Sep 2017 21:14) (105/82 - 137/90)  BP(mean): --  RR: 20 (08 Sep 2017 21:14) (20 - 22)  SpO2: 99% (08 Sep 2017 21:14) (96% - 100%)    Daily Height in cm: 157.48 (08 Sep 2017 13:20)    Daily     Physical Exam:  General: No apparent distress  HEENT: mild scleral icterus   CVS: +S1/S2, RRR,  Resp: left basilar crackles  GI: Soft, NT/ND +BS  MSK:  no evidence of joint deformities, no synovitis  normal ROM at all joints    uche-ungual hyperpigmentation  pitted nails  no telangiectasias    Neuro: AAOx3  muscle strength UE is 5/5 and LE 4/5   Skin:    hyperpigmented lesions over MCPs ( Gottron's papules ) and over elbows  hyperpigmentation around the eyes  no skin lesions over the neck or back       LABS:                        14.5   8.40  )-----------( 193      ( 08 Sep 2017 08:00 )             45.8     09-09    137  |  101  |  12  ----------------------------<  119<H>  4.4   |  23  |  0.67    Ca    9.7      09 Sep 2017 06:30  Phos  4.0     09-09  Mg     1.8     09-09    TPro  7.4  /  Alb  3.5  /  TBili  0.5  /  DBili  x   /  AST  36 /  ALT  41 /  AlkPhos  77  09-09    CK 1200...1143    RF 9.9    Pending:  KALE  dsDNA  CCP  Sm  Sjogren's  Jo1  Aldolase  KALE  Scleroderma        RADIOLOGY & ADDITIONAL STUDIES:   CT Chest No Cont (09.08.17 )  Interstitial lung disease with traction bronchiectasis more prominent   at the lung bases is suggestive of pulmonary fibrosis. The above findings   are suggestive of nonspecific interstitial pneumonitis (NSIP) pattern.  2. Mediastinal adenopathy secondary to pulmonary fibrosis.  3. 1.8 cm indeterminate hypodense lesion in the left lobe of liver.

## 2017-09-09 NOTE — CONSULT NOTE ADULT - ATTENDING COMMENTS
56 year old woman presents with acute on chronic worsening of dyspnea over the last 2 weeks, she states that she has had decreasing exercise tolerance over the past several months    has heliotrope rash around her eyes   hands  elevated CPK  CT scan suspicious for NSIP  will need rheumatologic work up  has dyspnea with minimal exertion   saturation in the mid 90's with nasal cannula  check cultures  start antibiotics  please admit to [facility].
patient seen and examined by me personally with fellow   agree with assessment and plan as above     will continue to follow closely and monitor for response to steroids   consider rapid escalation of tx if no response given the degree of ILD   check ECHO for PAH as well given SOB and the ILD

## 2017-09-09 NOTE — CONSULT NOTE ADULT - ASSESSMENT
51 yo W, with no known PMH, with progressive dyspnea found to have pulmonary fibrosis ( NSIP) along with elevated CK and Gottron's sign on exam    Differential include:  -DM related ILD  a form of "hypomyopathic dermatomyositis" described with elevated CK but w/o clinical evidene of muscle weakness  -anti-synthetase syndrome ( acute presentation, systemic signs of fever and weight loss, ILD )     Recommend:  -   - serology ( please add   - malignancy work up (as a trigger for DM presentation) should be pursued    Will follow with you      Kristina Alan MD   Rheumatology Fellow  Pager: [phone] 53 yo W, with no known PMH, with progressive dyspnea found to have pulmonary fibrosis ( NSIP) along with elevated CK and Gottron's sign on exam    Differential include:  -DM related ILD  a form of "hypomyopathic dermatomyositis" described with elevated CK but w/o clinical evidene of muscle weakness  -anti-synthetase syndrome ( acute presentation, systemic signs of fever and weight loss, ILD )     Recommend:  - solumedrol 50 mg IV od for now  - serology ( please add myomarker panel as sent out)   - malignancy work up (as a trigger for DM presentation) should be pursued ( CT A/P)  - work up for dysphagia     Will follow with you      Kristina Alan MD   Rheumatology Fellow  Pager: [phone] 53 yo W, with no known PMH, with progressive dyspnea found to have pulmonary fibrosis ( NSIP) along with elevated CK and Gottron's sign on exam    Differential include:  -DM related ILD  a form of "hypomyopathic dermatomyositis" described with elevated CK but w/o clinical evidene of muscle weakness  -anti-synthetase syndrome ( acute presentation, systemic signs of fever and weight loss, ILD )   anti - MDa5 disease   Recommend:  - solumedrol 50 mg IV od for now, consider IVIG vs Rituxan in ner future    - serology ( please add myomarker panel as sent out)   - malignancy work up (as a trigger for DM presentation) should be pursued ( CT A/P)  - work up for dysphagia     Will follow with you      Kristina Alan MD   Rheumatology Fellow  Pager: [phone]

## 2017-09-10 DIAGNOSIS — R13.10 DYSPHAGIA, UNSPECIFIED: ICD-10-CM

## 2017-09-10 DIAGNOSIS — M33.91 DERMATOPOLYMYOSITIS, UNSPECIFIED WITH RESPIRATORY INVOLVEMENT: ICD-10-CM

## 2017-09-10 LAB
BUN SERPL-MCNC: 14 MG/DL — SIGNIFICANT CHANGE UP (ref 7–23)
CALCIUM SERPL-MCNC: 10.2 MG/DL — SIGNIFICANT CHANGE UP (ref 8.4–10.5)
CHLORIDE SERPL-SCNC: 102 MMOL/L — SIGNIFICANT CHANGE UP (ref 98–107)
CO2 SERPL-SCNC: 20 MMOL/L — LOW (ref 22–31)
CREAT SERPL-MCNC: 0.65 MG/DL — SIGNIFICANT CHANGE UP (ref 0.5–1.3)
ENA RNP IGG SER-ACNC: < 0.2 ZZ — SIGNIFICANT CHANGE UP
ENA SM AB TITR SER: < 0.2 ZZ — SIGNIFICANT CHANGE UP
GLUCOSE SERPL-MCNC: 186 MG/DL — HIGH (ref 70–99)
HBV SURFACE AB SER-ACNC: NONREACTIVE — SIGNIFICANT CHANGE UP
HCT VFR BLD CALC: 47.4 % — HIGH (ref 34.5–45)
HCV AB S/CO SERPL IA: 0.12 S/CO — SIGNIFICANT CHANGE UP
HCV AB SERPL-IMP: SIGNIFICANT CHANGE UP
HGB BLD-MCNC: 14.9 G/DL — SIGNIFICANT CHANGE UP (ref 11.5–15.5)
L PNEUMO AG UR QL: NEGATIVE — SIGNIFICANT CHANGE UP
MCHC RBC-ENTMCNC: 25.9 PG — LOW (ref 27–34)
MCHC RBC-ENTMCNC: 31.4 % — LOW (ref 32–36)
MCV RBC AUTO: 82.3 FL — SIGNIFICANT CHANGE UP (ref 80–100)
NRBC # FLD: 0 — SIGNIFICANT CHANGE UP
PLATELET # BLD AUTO: 215 K/UL — SIGNIFICANT CHANGE UP (ref 150–400)
PMV BLD: 10.6 FL — SIGNIFICANT CHANGE UP (ref 7–13)
POTASSIUM SERPL-MCNC: 5.8 MMOL/L — HIGH (ref 3.5–5.3)
POTASSIUM SERPL-SCNC: 5.8 MMOL/L — HIGH (ref 3.5–5.3)
PROT SERPL-MCNC: 8.6 G/DL — HIGH (ref 6–8.3)
RBC # BLD: 5.76 M/UL — HIGH (ref 3.8–5.2)
RBC # FLD: 14.2 % — SIGNIFICANT CHANGE UP (ref 10.3–14.5)
SODIUM SERPL-SCNC: 140 MMOL/L — SIGNIFICANT CHANGE UP (ref 135–145)
SPECIMEN SOURCE: SIGNIFICANT CHANGE UP
SPECIMEN SOURCE: SIGNIFICANT CHANGE UP
VANCOMYCIN TROUGH SERPL-MCNC: 9.3 UG/ML — LOW (ref 10–20)
WBC # BLD: 10.86 K/UL — HIGH (ref 3.8–10.5)
WBC # FLD AUTO: 10.86 K/UL — HIGH (ref 3.8–10.5)

## 2017-09-10 PROCEDURE — 99233 SBSQ HOSP IP/OBS HIGH 50: CPT | Mod: GC

## 2017-09-10 PROCEDURE — 74177 CT ABD & PELVIS W/CONTRAST: CPT | Mod: 26

## 2017-09-10 PROCEDURE — 84165 PROTEIN E-PHORESIS SERUM: CPT | Mod: 26

## 2017-09-10 RX ORDER — VANCOMYCIN HCL 1 G
1250 VIAL (EA) INTRAVENOUS EVERY 12 HOURS
Qty: 0 | Refills: 0 | Status: DISCONTINUED | OUTPATIENT
Start: 2017-09-10 | End: 2017-09-11

## 2017-09-10 RX ADMIN — AZITHROMYCIN 500 MILLIGRAM(S): 500 TABLET, FILM COATED ORAL at 11:22

## 2017-09-10 RX ADMIN — PIPERACILLIN AND TAZOBACTAM 25 GRAM(S): 4; .5 INJECTION, POWDER, LYOPHILIZED, FOR SOLUTION INTRAVENOUS at 11:14

## 2017-09-10 RX ADMIN — PIPERACILLIN AND TAZOBACTAM 25 GRAM(S): 4; .5 INJECTION, POWDER, LYOPHILIZED, FOR SOLUTION INTRAVENOUS at 03:58

## 2017-09-10 RX ADMIN — Medication 81 MILLIGRAM(S): at 11:22

## 2017-09-10 RX ADMIN — PIPERACILLIN AND TAZOBACTAM 25 GRAM(S): 4; .5 INJECTION, POWDER, LYOPHILIZED, FOR SOLUTION INTRAVENOUS at 18:17

## 2017-09-10 RX ADMIN — Medication 250 MILLIGRAM(S): at 10:08

## 2017-09-10 RX ADMIN — Medication 50 MILLIGRAM(S): at 05:29

## 2017-09-10 RX ADMIN — Medication 166.67 MILLIGRAM(S): at 22:39

## 2017-09-10 NOTE — PROGRESS NOTE ADULT - ASSESSMENT
1) Pulmonary fibrosis - possible NSIP, f/u rheumatology work up, f/u pulmonary r/o dermatomyosistis vs anti synthetase syndrome, CT a/p pending to r/o malignancy    2) Liver mass - consider GI consult , MRI abd    3) Elevated troponins - unlikely ACS D/C HEPARIN, f/u 2d echo

## 2017-09-10 NOTE — PROGRESS NOTE ADULT - SUBJECTIVE AND OBJECTIVE BOX
CHIEF COMPLAINT:    Interval Events:    REVIEW OF SYSTEMS:  Constitutional:   Eyes:  ENT:  CV:  Resp:  GI:  :  MSK:  Integumentary:  Neurological:  Psychiatric:  Endocrine:  Hematologic/Lymphatic:  Allergic/Immunologic:  [ ] All other systems negative  [ ] Unable to assess ROS because ________    OBJECTIVE:  ICU Vital Signs Last 24 Hrs  T(C): 36.6 (10 Sep 2017 05:29), Max: 36.9 (09 Sep 2017 21:43)  T(F): 97.9 (10 Sep 2017 05:29), Max: 98.4 (09 Sep 2017 21:43)  HR: 90 (10 Sep 2017 05:29) (84 - 90)  BP: 110/78 (10 Sep 2017 05:29) (105/71 - 111/82)  BP(mean): --  ABP: --  ABP(mean): --  RR: 18 (10 Sep 2017 05:29) (18 - 20)  SpO2: 99% (10 Sep 2017 05:29) (98% - 99%)        CAPILLARY BLOOD GLUCOSE          PHYSICAL EXAM:  General:   HEENT:   Lymph Nodes:  Neck:   Respiratory:   Cardiovascular:   Abdomen:   Extremities:   Skin:   Neurological:  Psychiatry:    HOSPITAL MEDICATIONS:  MEDICATIONS  (STANDING):  aspirin enteric coated 81 milliGRAM(s) Oral daily  methylPREDNISolone sodium succinate Injectable 50 milliGRAM(s) IV Push daily  piperacillin/tazobactam IVPB. 3.375 Gram(s) IV Intermittent every 8 hours  azithromycin   Tablet 500 milliGRAM(s) Oral daily  vancomycin  IVPB      vancomycin  IVPB 1000 milliGRAM(s) IV Intermittent every 12 hours    MEDICATIONS  (PRN):  acetaminophen   Tablet. 650 milliGRAM(s) Oral every 6 hours PRN Mild Pain (1 - 3)  simethicone 80 milliGRAM(s) Chew every 6 hours PRN Gas      LABS:                        14.9   10.86 )-----------( 215      ( 10 Sep 2017 06:40 )             47.4     09-10    140  |  102  |  14  ----------------------------<  186<H>  5.8<H>   |  20<L>  |  0.65    Ca    10.2      10 Sep 2017 06:40  Phos  4.0     09-09  Mg     1.8     09-09    TPro  8.6<H>  /  Alb  x   /  TBili  x   /  DBili  x   /  AST  x   /  ALT  x   /  AlkPhos  x   09-10    PT/INR - ( 08 Sep 2017 11:03 )   PT: 11.9 SEC;   INR: 1.06          PTT - ( 08 Sep 2017 11:03 )  PTT:36.1 SEC  Urinalysis Basic - ( 09 Sep 2017 18:35 )    Color: PLYEL / Appearance: CLEAR / S.007 / pH: 6.0  Gluc: NEGATIVE / Ketone: NEGATIVE  / Bili: NEGATIVE / Urobili: NORMAL E.U.   Blood: NEGATIVE / Protein: NEGATIVE / Nitrite: NEGATIVE   Leuk Esterase: NEGATIVE / RBC: 0-2 / WBC 0-2   Sq Epi: OCC / Non Sq Epi: x / Bacteria: x            MICROBIOLOGY:     RADIOLOGY:  [ ] Reviewed and interpreted by me    PULMONARY FUNCTION TESTS:    EKG: CHIEF COMPLAINT: Patient is a 52y old  Female who presents with a chief complaint of Shortness of breath (08 Sep 2017 13:20)    Interval Events: No acute events overnight    REVIEW OF SYSTEMS:  Constitutional: Feels well except for cough, SOB  Eyes:  ENT:  CV: Denies CP  Resp: c/o cough, sob  GI: s/o difficulty swallowing at times  :  MSK: c/o muscle weakness  Integumentary:  Neurological:  Psychiatric:  Endocrine:  Hematologic/Lymphatic:  Allergic/Immunologic:  [x] All other systems negative  [ ] Unable to assess ROS because ________    OBJECTIVE:  ICU Vital Signs Last 24 Hrs  T(C): 36.6 (10 Sep 2017 05:29), Max: 36.9 (09 Sep 2017 21:43)  T(F): 97.9 (10 Sep 2017 05:29), Max: 98.4 (09 Sep 2017 21:43)  HR: 90 (10 Sep 2017 05:29) (84 - 90)  BP: 110/78 (10 Sep 2017 05:29) (105/71 - 111/82)  BP(mean): --  ABP: --  ABP(mean): --  RR: 18 (10 Sep 2017 05:29) (18 - 20)  SpO2: 99% (10 Sep 2017 05:29) (98% - 99%)        CAPILLARY BLOOD GLUCOSE      HOSPITAL MEDICATIONS:  MEDICATIONS  (STANDING):  aspirin enteric coated 81 milliGRAM(s) Oral daily  methylPREDNISolone sodium succinate Injectable 50 milliGRAM(s) IV Push daily  piperacillin/tazobactam IVPB. 3.375 Gram(s) IV Intermittent every 8 hours  azithromycin   Tablet 500 milliGRAM(s) Oral daily  vancomycin  IVPB      vancomycin  IVPB 1000 milliGRAM(s) IV Intermittent every 12 hours    MEDICATIONS  (PRN):  acetaminophen   Tablet. 650 milliGRAM(s) Oral every 6 hours PRN Mild Pain (1 - 3)  simethicone 80 milliGRAM(s) Chew every 6 hours PRN Gas      LABS:                        14.9   10.86 )-----------( 215      ( 10 Sep 2017 06:40 )             47.4     09-10    140  |  102  |  14  ----------------------------<  186<H>  5.8<H>   |  20<L>  |  0.65    Ca    10.2      10 Sep 2017 06:40  Phos  4.0       Mg     1.8         TPro  8.6<H>  /  Alb  x   /  TBili  x   /  DBili  x   /  AST  x   /  ALT  x   /  AlkPhos  x   09-10    PT/INR - ( 08 Sep 2017 11:03 )   PT: 11.9 SEC;   INR: 1.06          PTT - ( 08 Sep 2017 11:03 )  PTT:36.1 SEC  Urinalysis Basic - ( 09 Sep 2017 18:35 )    Color: PLYEL / Appearance: CLEAR / S.007 / pH: 6.0  Gluc: NEGATIVE / Ketone: NEGATIVE  / Bili: NEGATIVE / Urobili: NORMAL E.U.   Blood: NEGATIVE / Protein: NEGATIVE / Nitrite: NEGATIVE   Leuk Esterase: NEGATIVE / RBC: 0-2 / WBC 0-2   Sq Epi: OCC / Non Sq Epi: x / Bacteria: x            MICROBIOLOGY:     RADIOLOGY:  [ ] Reviewed and interpreted by me    PULMONARY FUNCTION TESTS:    EKG:

## 2017-09-10 NOTE — PROGRESS NOTE ADULT - SUBJECTIVE AND OBJECTIVE BOX
INTERVAL HISTORY: pt is lying in bed comfortable, SOB on exertion, no chest pains  	  MEDICATIONS:  aspirin enteric coated 81 milliGRAM(s) Oral daily    piperacillin/tazobactam IVPB. 3.375 Gram(s) IV Intermittent every 8 hours  azithromycin   Tablet 500 milliGRAM(s) Oral daily  vancomycin  IVPB      vancomycin  IVPB 1000 milliGRAM(s) IV Intermittent every 12 hours      acetaminophen   Tablet. 650 milliGRAM(s) Oral every 6 hours PRN    simethicone 80 milliGRAM(s) Chew every 6 hours PRN    methylPREDNISolone sodium succinate Injectable 50 milliGRAM(s) IV Push daily        PHYSICAL EXAM:  T(C): 36.6 (09-10-17 @ 05:29), Max: 36.9 (09-09-17 @ 21:43)  HR: 90 (09-10-17 @ 05:29) (84 - 90)  BP: 110/78 (09-10-17 @ 05:29) (105/71 - 111/82)  RR: 18 (09-10-17 @ 05:29) (18 - 20)  SpO2: 99% (09-10-17 @ 05:29) (98% - 99%)  Wt(kg): --  I&O's Summary        Appearance: Normal	  HEENT:   heliotropic rash  Cardiovascular: Normal S1 S2, No JVD, No murmurs, No edema  Respiratory: b/l crackles  Gastrointestinal:  Soft, Non-tender, + BS	  Extremities: gottron's papules                                    14.9   10.86 )-----------( 215      ( 10 Sep 2017 06:40 )             47.4     09-10    140  |  102  |  14  ----------------------------<  186<H>  5.8<H>   |  20<L>  |  0.65    Ca    10.2      10 Sep 2017 06:40  Phos  4.0     09-09  Mg     1.8     09-09    TPro  8.6<H>  /  Alb  x   /  TBili  x   /  DBili  x   /  AST  x   /  ALT  x   /  AlkPhos  x   09-10    proBNP:   Lipid Profile:   HgA1c:   TSH:

## 2017-09-10 NOTE — PROGRESS NOTE ADULT - ATTENDING COMMENTS
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

## 2017-09-11 LAB
BUN SERPL-MCNC: 16 MG/DL — SIGNIFICANT CHANGE UP (ref 7–23)
CALCIUM SERPL-MCNC: 9.7 MG/DL — SIGNIFICANT CHANGE UP (ref 8.4–10.5)
CHLORIDE SERPL-SCNC: 99 MMOL/L — SIGNIFICANT CHANGE UP (ref 98–107)
CO2 SERPL-SCNC: 26 MMOL/L — SIGNIFICANT CHANGE UP (ref 22–31)
CREAT SERPL-MCNC: 0.83 MG/DL — SIGNIFICANT CHANGE UP (ref 0.5–1.3)
GLUCOSE SERPL-MCNC: 120 MG/DL — HIGH (ref 70–99)
HCT VFR BLD CALC: 46.1 % — HIGH (ref 34.5–45)
HGB BLD-MCNC: 14.8 G/DL — SIGNIFICANT CHANGE UP (ref 11.5–15.5)
MCHC RBC-ENTMCNC: 26.6 PG — LOW (ref 27–34)
MCHC RBC-ENTMCNC: 32.1 % — SIGNIFICANT CHANGE UP (ref 32–36)
MCV RBC AUTO: 82.8 FL — SIGNIFICANT CHANGE UP (ref 80–100)
NRBC # FLD: 0 — SIGNIFICANT CHANGE UP
PLATELET # BLD AUTO: 204 K/UL — SIGNIFICANT CHANGE UP (ref 150–400)
PMV BLD: 11 FL — SIGNIFICANT CHANGE UP (ref 7–13)
POTASSIUM SERPL-MCNC: 4.1 MMOL/L — SIGNIFICANT CHANGE UP (ref 3.5–5.3)
POTASSIUM SERPL-SCNC: 4.1 MMOL/L — SIGNIFICANT CHANGE UP (ref 3.5–5.3)
RBC # BLD: 5.57 M/UL — HIGH (ref 3.8–5.2)
RBC # FLD: 14.5 % — SIGNIFICANT CHANGE UP (ref 10.3–14.5)
SMOOTH MUSCLE AB SER-ACNC: SIGNIFICANT CHANGE UP
SODIUM SERPL-SCNC: 139 MMOL/L — SIGNIFICANT CHANGE UP (ref 135–145)
WBC # BLD: 12.08 K/UL — HIGH (ref 3.8–10.5)
WBC # FLD AUTO: 12.08 K/UL — HIGH (ref 3.8–10.5)

## 2017-09-11 PROCEDURE — 74230 X-RAY XM SWLNG FUNCJ C+: CPT | Mod: 26

## 2017-09-11 PROCEDURE — 93306 TTE W/DOPPLER COMPLETE: CPT | Mod: 26

## 2017-09-11 PROCEDURE — 99233 SBSQ HOSP IP/OBS HIGH 50: CPT | Mod: GC

## 2017-09-11 RX ADMIN — Medication 81 MILLIGRAM(S): at 13:29

## 2017-09-11 RX ADMIN — PIPERACILLIN AND TAZOBACTAM 25 GRAM(S): 4; .5 INJECTION, POWDER, LYOPHILIZED, FOR SOLUTION INTRAVENOUS at 03:04

## 2017-09-11 RX ADMIN — Medication 1 TABLET(S): at 21:26

## 2017-09-11 RX ADMIN — Medication 50 MILLIGRAM(S): at 05:56

## 2017-09-11 RX ADMIN — Medication 100 MILLIGRAM(S): at 20:31

## 2017-09-11 NOTE — PROGRESS NOTE ADULT - SUBJECTIVE AND OBJECTIVE BOX
INTERVAL HISTORY:  	  MEDICATIONS:  aspirin enteric coated 81 milliGRAM(s) Oral daily        acetaminophen   Tablet. 650 milliGRAM(s) Oral every 6 hours PRN    simethicone 80 milliGRAM(s) Chew every 6 hours PRN    methylPREDNISolone sodium succinate Injectable 50 milliGRAM(s) IV Push daily        PHYSICAL EXAM:  T(C): 36.8 (09-11-17 @ 13:30), Max: 36.8 (09-11-17 @ 05:55)  HR: 100 (09-11-17 @ 13:30) (78 - 100)  BP: 127/88 (09-11-17 @ 13:30) (100/63 - 127/88)  RR: 17 (09-11-17 @ 13:30) (17 - 18)  SpO2: 100% (09-11-17 @ 13:30) (98% - 100%)  Wt(kg): --  I&O's Summary        Appearance: Normal	  HEENT:   Normal oral mucosa, PERRL, EOMI	  Cardiovascular: Normal S1 S2, No JVD, No murmurs, No edema  Respiratory: Lungs clear to auscultation	  Gastrointestinal:  Soft, Non-tender, + BS	  Extremities: Normal range of motion, No clubbing, cyanosis or edema                                    14.8   12.08 )-----------( 204      ( 11 Sep 2017 06:51 )             46.1     09-11    139  |  99  |  16  ----------------------------<  120<H>  4.1   |  26  |  0.83    Ca    9.7      11 Sep 2017 06:51    TPro  8.6<H>  /  Alb  x   /  TBili  x   /  DBili  x   /  AST  x   /  ALT  x   /  AlkPhos  x   09-10    proBNP:   Lipid Profile:   HgA1c:   TSH: INTERVAL HISTORY: pt is lying in bed comfortable, not in distress, no cp   	  MEDICATIONS:  aspirin enteric coated 81 milliGRAM(s) Oral daily        acetaminophen   Tablet. 650 milliGRAM(s) Oral every 6 hours PRN    simethicone 80 milliGRAM(s) Chew every 6 hours PRN    methylPREDNISolone sodium succinate Injectable 50 milliGRAM(s) IV Push daily        PHYSICAL EXAM:  T(C): 36.8 (09-11-17 @ 13:30), Max: 36.8 (09-11-17 @ 05:55)  HR: 100 (09-11-17 @ 13:30) (78 - 100)  BP: 127/88 (09-11-17 @ 13:30) (100/63 - 127/88)  RR: 17 (09-11-17 @ 13:30) (17 - 18)  SpO2: 100% (09-11-17 @ 13:30) (98% - 100%)  Wt(kg): --  I&O's Summary        Appearance: Normal	  HEENT:   heliotropic rash	  Cardiovascular: Normal S1 S2, No JVD, No murmurs, No edema  Respiratory: bibasilar crackles  Gastrointestinal:  Soft, Non-tender, + BS	  Extremities: gottron papules                                    14.8   12.08 )-----------( 204      ( 11 Sep 2017 06:51 )             46.1     09-11    139  |  99  |  16  ----------------------------<  120<H>  4.1   |  26  |  0.83    Ca    9.7      11 Sep 2017 06:51    TPro  8.6<H>  /  Alb  x   /  TBili  x   /  DBili  x   /  AST  x   /  ALT  x   /  AlkPhos  x   09-10    proBNP:   Lipid Profile:   HgA1c:   TSH:

## 2017-09-11 NOTE — SWALLOW VFSS/MBS ASSESSMENT ADULT - NS SWALLOW VFSS REC ASPIR MON
fever/pneumonia/cough/throat clearing/change of breathing pattern/oral hygiene/position upright (90Y)/gurgly voice/upper respiratory infection

## 2017-09-11 NOTE — SWALLOW VFSS/MBS ASSESSMENT ADULT - COMMENTS
The patient was received in the radiology suite this AM, at which time she was alert and cooperative. Patient is reporting pharyngeal stasis on the L side. Per charting, the patient is a 51 y/o F who presented to [facility] with "PYLE x 2 weeks, cough/ CP, on Tele for elevated CE's started on a Heparin gtt." Recent CT of the chest revealed, "Interstitial lung disease with traction bronchiectasis more prominent at the lung bases is suggestive of pulmonary fibrosis. The above findings are suggestive of nonspecific interstitial pneumonitis (NSIP) pattern." Discussed results and recommendations from this evaluation with the patient and call out to MD. The patient was received in the radiology suite this AM, at which time she was alert and cooperative. Patient is reporting pharyngeal stasis on the L side for ~1 month. In addition, patient reporting decreased PO intake. Per charting, the patient is a 53 y/o F who presented to [facility] with "PYLE x 2 weeks, cough/ CP, on Tele for elevated CE's started on a Heparin gtt." Recent CT of the chest revealed, "Interstitial lung disease with traction bronchiectasis more prominent at the lung bases is suggestive of pulmonary fibrosis. The above findings are suggestive of nonspecific interstitial pneumonitis (NSIP) pattern." Discussed results and recommendations from this evaluation with the patient and call out to MD. The patient was received in the radiology suite this AM, at which time she was alert and cooperative. Patient is reporting pharyngeal stasis on the L side for ~1 month. In addition, patient reporting decreased PO intake and changes in her vocal quality. Per charting, the patient is a 51 y/o F who presented to [facility] with "PYLE x 2 weeks, cough/ CP, on Tele for elevated CE's started on a Heparin gtt." Recent CT of the chest revealed, "Interstitial lung disease with traction bronchiectasis more prominent at the lung bases is suggestive of pulmonary fibrosis. The above findings are suggestive of nonspecific interstitial pneumonitis (NSIP) pattern." Discussed results and recommendations from this evaluation with the patient and call out to MD.

## 2017-09-11 NOTE — SWALLOW VFSS/MBS ASSESSMENT ADULT - ADDITIONAL RECOMMENDATIONS
This department to follow during this admission as schedule permits. This department to follow during this admission for swallowing therapy as schedule permits. Continued swallowing therapy upon discharge from [facility] is recommended (rehab vs home care vs [facility] outpatient clinic - [phone]).

## 2017-09-11 NOTE — PROGRESS NOTE ADULT - PROBLEM SELECTOR PLAN 4
-Cinesophagram completed. May continue with regular diet  -Speech and swallow will f/u with exercises

## 2017-09-11 NOTE — PROGRESS NOTE ADULT - SUBJECTIVE AND OBJECTIVE BOX
CHIEF COMPLAINT:    Interval Events:    REVIEW OF SYSTEMS:  Constitutional:   Eyes:  ENT:  CV:  Resp:  GI:  :  MSK:  Integumentary:  Neurological:  Psychiatric:  Endocrine:  Hematologic/Lymphatic:  Allergic/Immunologic:  [ ] All other systems negative  [ ] Unable to assess ROS because ________    OBJECTIVE:  ICU Vital Signs Last 24 Hrs  T(C): 36.8 (11 Sep 2017 05:55), Max: 36.8 (11 Sep 2017 05:55)  T(F): 98.3 (11 Sep 2017 05:55), Max: 98.3 (11 Sep 2017 05:55)  HR: 78 (10 Sep 2017 22:38) (78 - 95)  BP: 100/63 (11 Sep 2017 05:55) (100/63 - 125/81)  BP(mean): --  ABP: --  ABP(mean): --  RR: 18 (11 Sep 2017 05:55) (18 - 18)  SpO2: 99% (11 Sep 2017 05:55) (98% - 99%)        CAPILLARY BLOOD GLUCOSE          PHYSICAL EXAM:  General:   HEENT:   Lymph Nodes:  Neck:   Respiratory:   Cardiovascular:   Abdomen:   Extremities:   Skin:   Neurological:  Psychiatry:    HOSPITAL MEDICATIONS:  MEDICATIONS  (STANDING):  aspirin enteric coated 81 milliGRAM(s) Oral daily  methylPREDNISolone sodium succinate Injectable 50 milliGRAM(s) IV Push daily  piperacillin/tazobactam IVPB. 3.375 Gram(s) IV Intermittent every 8 hours  azithromycin   Tablet 500 milliGRAM(s) Oral daily  vancomycin  IVPB 1250 milliGRAM(s) IV Intermittent every 12 hours    MEDICATIONS  (PRN):  acetaminophen   Tablet. 650 milliGRAM(s) Oral every 6 hours PRN Mild Pain (1 - 3)  simethicone 80 milliGRAM(s) Chew every 6 hours PRN Gas      LABS:                        14.8   12.08 )-----------( 204      ( 11 Sep 2017 06:51 )             46.1     09-    139  |  99  |  16  ----------------------------<  120<H>  4.1   |  26  |  0.83    Ca    9.7      11 Sep 2017 06:51    TPro  8.6<H>  /  Alb  x   /  TBili  x   /  DBili  x   /  AST  x   /  ALT  x   /  AlkPhos  x   09-10      Urinalysis Basic - ( 09 Sep 2017 18:35 )    Color: PLYEL / Appearance: CLEAR / S.007 / pH: 6.0  Gluc: NEGATIVE / Ketone: NEGATIVE  / Bili: NEGATIVE / Urobili: NORMAL E.U.   Blood: NEGATIVE / Protein: NEGATIVE / Nitrite: NEGATIVE   Leuk Esterase: NEGATIVE / RBC: 0-2 / WBC 0-2   Sq Epi: OCC / Non Sq Epi: x / Bacteria: x            MICROBIOLOGY:     RADIOLOGY:  [ ] Reviewed and interpreted by me    PULMONARY FUNCTION TESTS:    EKG: CHIEF COMPLAINT: Patient is a 52y old  Female who presents with a chief complaint of Shortness of breath (08 Sep 2017 13:20)    Interval Events: No acute events overnight    REVIEW OF SYSTEMS:  Constitutional: Feels the same  Eyes:  ENT:  CV: Denies CP  Resp: c/o continued SOB and cough  GI:  :  MSK:  Integumentary:  Neurological:  Psychiatric:  Endocrine:  Hematologic/Lymphatic:  Allergic/Immunologic:  [x] All other systems negative  [ ] Unable to assess ROS because ________    OBJECTIVE:  ICU Vital Signs Last 24 Hrs  T(C): 36.8 (11 Sep 2017 05:55), Max: 36.8 (11 Sep 2017 05:55)  T(F): 98.3 (11 Sep 2017 05:55), Max: 98.3 (11 Sep 2017 05:55)  HR: 78 (10 Sep 2017 22:38) (78 - 95)  BP: 100/63 (11 Sep 2017 05:55) (100/63 - 125/81)  BP(mean): --  ABP: --  ABP(mean): --  RR: 18 (11 Sep 2017 05:55) (18 - 18)  SpO2: 99% (11 Sep 2017 05:55) (98% - 99%)        HOSPITAL MEDICATIONS:  MEDICATIONS  (STANDING):  aspirin enteric coated 81 milliGRAM(s) Oral daily  methylPREDNISolone sodium succinate Injectable 50 milliGRAM(s) IV Push daily  piperacillin/tazobactam IVPB. 3.375 Gram(s) IV Intermittent every 8 hours  azithromycin   Tablet 500 milliGRAM(s) Oral daily  vancomycin  IVPB 1250 milliGRAM(s) IV Intermittent every 12 hours    MEDICATIONS  (PRN):  acetaminophen   Tablet. 650 milliGRAM(s) Oral every 6 hours PRN Mild Pain (1 - 3)  simethicone 80 milliGRAM(s) Chew every 6 hours PRN Gas      LABS:                        14.8   12.08 )-----------( 204      ( 11 Sep 2017 06:51 )             46.1     09-    139  |  99  |  16  ----------------------------<  120<H>  4.1   |  26  |  0.83    Ca    9.7      11 Sep 2017 06:51    TPro  8.6<H>  /  Alb  x   /  TBili  x   /  DBili  x   /  AST  x   /  ALT  x   /  AlkPhos  x   09-10      Urinalysis Basic - ( 09 Sep 2017 18:35 )    Color: PLYEL / Appearance: CLEAR / S.007 / pH: 6.0  Gluc: NEGATIVE / Ketone: NEGATIVE  / Bili: NEGATIVE / Urobili: NORMAL E.U.   Blood: NEGATIVE / Protein: NEGATIVE / Nitrite: NEGATIVE   Leuk Esterase: NEGATIVE / RBC: 0-2 / WBC 0-2   Sq Epi: OCC / Non Sq Epi: x / Bacteria: x            MICROBIOLOGY:     RADIOLOGY:  [ ] Reviewed and interpreted by me    PULMONARY FUNCTION TESTS:    EKG:

## 2017-09-11 NOTE — PROGRESS NOTE ADULT - SUBJECTIVE AND OBJECTIVE BOX
ALIS SARAVIA  [id number]    INTERVAL HPI/OVERNIGHT EVENTS:  No acute events overnight.   On further review of history,     MEDICATIONS  (STANDING):  aspirin enteric coated 81 milliGRAM(s) Oral daily  methylPREDNISolone sodium succinate Injectable 50 milliGRAM(s) IV Push daily  trimethoprim  160 mG/sulfamethoxazole 800 mG 1 Tablet(s) Oral <User Schedule>    MEDICATIONS  (PRN):  acetaminophen   Tablet. 650 milliGRAM(s) Oral every 6 hours PRN Mild Pain (1 - 3)  simethicone 80 milliGRAM(s) Chew every 6 hours PRN Gas      Allergies    No Known Allergies    Intolerances        Review of Systems:   General: No fevers/chills, no fatigue  HEENT: No blurry vision, dysphagia, or odynophagia  CVS: No CP/palpitations  Resp: No SOB/wheezing  GI: No N/V/C/D/abdominal pain  MSK:   Skin: No new rashes  Neuro: No headaches      Vital Signs Last 24 Hrs  T(C): 36.8 (11 Sep 2017 13:30), Max: 36.8 (11 Sep 2017 05:55)  T(F): 98.2 (11 Sep 2017 13:30), Max: 98.3 (11 Sep 2017 05:55)  HR: 100 (11 Sep 2017 13:30) (78 - 100)  BP: 127/88 (11 Sep 2017 13:30) (100/63 - 127/88)  BP(mean): --  RR: 17 (11 Sep 2017 13:30) (17 - 18)  SpO2: 100% (11 Sep 2017 13:30) (98% - 100%)    Physical Exam:  General: NAD  HEENT: EOMI, MMM  Cardio: +S1/S2, RRR  Resp: CTA b/l  GI: +BS, soft, NT/ND  MSK:  Neuro: AAOx3  Psych: wnl    LABS:                        14.8   12.08 )-----------( 204      ( 11 Sep 2017 06:51 )             46.1     09-    139  |  99  |  16  ----------------------------<  120<H>  4.1   |  26  |  0.83    Ca    9.7      11 Sep 2017 06:51    TPro  8.6<H>  /  Alb  x   /  TBili  x   /  DBili  x   /  AST  x   /  ALT  x   /  AlkPhos  x   09-10      Urinalysis Basic - ( 09 Sep 2017 18:35 )    Color: PLYEL / Appearance: CLEAR / S.007 / pH: 6.0  Gluc: NEGATIVE / Ketone: NEGATIVE  / Bili: NEGATIVE / Urobili: NORMAL E.U.   Blood: NEGATIVE / Protein: NEGATIVE / Nitrite: NEGATIVE   Leuk Esterase: NEGATIVE / RBC: 0-2 / WBC 0-2   Sq Epi: OCC / Non Sq Epi: x / Bacteria: x          RADIOLOGY & ADDITIONAL TESTS: ALIS SARAVIA  [id number]    INTERVAL HPI/OVERNIGHT EVENTS:  No acute events overnight.   On further review of history,     MEDICATIONS  (STANDING):  aspirin enteric coated 81 milliGRAM(s) Oral daily  methylPREDNISolone sodium succinate Injectable 50 milliGRAM(s) IV Push daily  trimethoprim  160 mG/sulfamethoxazole 800 mG 1 Tablet(s) Oral <User Schedule>    MEDICATIONS  (PRN):  acetaminophen   Tablet. 650 milliGRAM(s) Oral every 6 hours PRN Mild Pain (1 - 3)  simethicone 80 milliGRAM(s) Chew every 6 hours PRN Gas      Allergies    No Known Allergies    Intolerances        Review of Systems:   General: No fevers/chills, no fatigue  HEENT: No blurry vision, dysphagia, or odynophagia  CVS: No CP/palpitations  Resp:  SOB with exertion, laying down flat, needs 3 pillows   GI: No N/V/C/D/abdominal pain  MSK: no synovitis, hyperpigmented nodules over MCP, puffy fingers BL  Skin: No new rashes  Neuro: No headaches      Vital Signs Last 24 Hrs  T(C): 36.8 (11 Sep 2017 13:30), Max: 36.8 (11 Sep 2017 05:55)  T(F): 98.2 (11 Sep 2017 13:30), Max: 98.3 (11 Sep 2017 05:55)  HR: 100 (11 Sep 2017 13:30) (78 - 100)  BP: 127/88 (11 Sep 2017 13:30) (100/63 - 127/88)  BP(mean): --  RR: 17 (11 Sep 2017 13:30) (17 - 18)  SpO2: 100% (11 Sep 2017 13:30) (98% - 100%)    Physical Exam:  General: NAD  HEENT: EOMI, MMM, hyperpigmented rash surrounding eyes, diminished from previous   Cardio: +S1/S2, RRR  Resp: CTA b/l, slight velcro sounding breath sounds BL with decreased air entry in the bases  GI: +BS, soft, NT/ND  MSK: no synovitis, hyperpigmented non tender nodules over BL MCPs, strength in BL UE and LE extremities 5/5, head and neck and hip strength 5/5, no noticeable proximal muscle weakness  Neuro: AAOx3  Psych: wnl    LABS:                        14.8   12.08 )-----------( 204      ( 11 Sep 2017 06:51 )             46.1         139  |  99  |  16  ----------------------------<  120<H>  4.1   |  26  |  0.83    Ca    9.7      11 Sep 2017 06:51    TPro  8.6<H>  /  Alb  x   /  TBili  x   /  DBili  x   /  AST  x   /  ALT  x   /  AlkPhos  x   09-10  Creatine Kinase, Serum: 1143: CKMB is no longer reflexively performed on elevated CK  results.  To get CKMB results please order "CK AND CKMB".  Effective Loretta 15, 2016. u/L (17 @ 12:20)    ALLAN - RNP Antibody: < 0.2: Fluorescent Bead Immunoassy  Reference Ranges for RNP and SM:  <1.0 AI (negative)  > or =1.0 AI (positive)  Reference values apply to all ages ZZ (17 @ 17:10)    ALLAN Antibody Screening Test (17 @ 17:10)    ALLAN - Smith Antibody: < 0.2 ZZ    ALLAN - RNP Antibody: < 0.2: Fluorescent Bead Immunoassy  Reference Ranges for RNP and SM:  <1.0 AI (negative)  > or =1.0 AI (positive)  Reference values apply to all ages ZZ    Smooth Muscle Antibody: &<1:20: Method: Indirect fluorescent antibody. (17 @ 22:08)    SUKHWINDER-1 Antibody: <0.2: Fluorescent Bead Immunoassay  Sukhwinder 1 Antibodies, IgG  <1.0 AI (negative)  > or =1.0 AI (positive)  Reference values apply to all ages. ZZ (17 @ 22:08)    Anti SS-A Antibody: 7.5 ZZ (17 @ 22:08)    Anti SS-A Antibody: 7.5 ZZ (17 @ 22:08)    Anti SS-B Antibody: <0.2: Fluorescent Bead Immunoassay  Reference Ranges for SS-A AND SS-B:  <1.0 AI (negative)  > or =1.0 AI (positive)  Reference values apply  to all ages. ZZ (17 @ 22:08)    Urinalysis Basic - ( 09 Sep 2017 18:35 )    Color: PLYEL / Appearance: CLEAR / S.007 / pH: 6.0  Gluc: NEGATIVE / Ketone: NEGATIVE  / Bili: NEGATIVE / Urobili: NORMAL E.U.   Blood: NEGATIVE / Protein: NEGATIVE / Nitrite: NEGATIVE   Leuk Esterase: NEGATIVE / RBC: 0-2 / WBC 0-2   Sq Epi: OCC / Non Sq Epi: x / Bacteria: x          RADIOLOGY & ADDITIONAL TESTS: ALIS SARAVIA  [id number]    INTERVAL HPI/OVERNIGHT EVENTS:  No acute events overnight.   notes mild improvement at rest since beginning steroids  s/p MBS for evaluation of dysphagia      MEDICATIONS  (STANDING):  aspirin enteric coated 81 milliGRAM(s) Oral daily  methylPREDNISolone sodium succinate Injectable 50 milliGRAM(s) IV Push daily  trimethoprim  160 mG/sulfamethoxazole 800 mG 1 Tablet(s) Oral <User Schedule>    MEDICATIONS  (PRN):  acetaminophen   Tablet. 650 milliGRAM(s) Oral every 6 hours PRN Mild Pain (1 - 3)  simethicone 80 milliGRAM(s) Chew every 6 hours PRN Gas      Allergies    No Known Allergies    Intolerances        Review of Systems:   General: No fevers/chills, no fatigue  HEENT: No blurry vision, dysphagia, or odynophagia  CVS: No CP/palpitations  Resp:  SOB with exertion, laying down flat, needs 3 pillows   GI: No N/V/C/D/abdominal pain  MSK: no synovitis, hyperpigmented nodules over MCP, puffy fingers BL  Skin: No new rashes  Neuro: No headaches      Vital Signs Last 24 Hrs  T(C): 36.8 (11 Sep 2017 13:30), Max: 36.8 (11 Sep 2017 05:55)  T(F): 98.2 (11 Sep 2017 13:30), Max: 98.3 (11 Sep 2017 05:55)  HR: 100 (11 Sep 2017 13:30) (78 - 100)  BP: 127/88 (11 Sep 2017 13:30) (100/63 - 127/88)  BP(mean): --  RR: 17 (11 Sep 2017 13:30) (17 - 18)  SpO2: 100% (11 Sep 2017 13:30) (98% - 100%)    Physical Exam:  General: NAD  HEENT: EOMI, MMM, hyperpigmented rash surrounding eyes, diminished from previous   Cardio: +S1/S2, RRR  Resp: CTA b/l, slight velcro sounding breath sounds BL with decreased air entry in the bases  GI: +BS, soft, NT/ND  MSK: no synovitis, hyperpigmented non tender nodules over BL MCPs, strength in BL UE and LE extremities 5/5, head and neck and hip strength 5/5, no noticeable proximal muscle weakness  Skin: heliotrope rash; Gottron's papules; periungal hyperpigmentation/thickening  Neuro: AAOx3  Psych: wnl    LABS:                        14.8   12.08 )-----------( 204      ( 11 Sep 2017 06:51 )             46.1         139  |  99  |  16  ----------------------------<  120<H>  4.1   |  26  |  0.83    Ca    9.7      11 Sep 2017 06:51    TPro  8.6<H>  /  Alb  x   /  TBili  x   /  DBili  x   /  AST  x   /  ALT  x   /  AlkPhos  x   09-10  Creatine Kinase, Serum: 1143: CKMB is no longer reflexively performed on elevated CK  results.  To get CKMB results please order "CK AND CKMB".  Effective Loretta 15, 2016. u/L (17 @ 12:20)    ALLAN - RNP Antibody: < 0.2: Fluorescent Bead Immunoassy  Reference Ranges for RNP and SM:  <1.0 AI (negative)  > or =1.0 AI (positive)  Reference values apply to all ages ZZ (17 @ 17:10)    ALLAN Antibody Screening Test (17 @ 17:10)    ALLAN - Smith Antibody: < 0.2 ZZ    ALLAN - RNP Antibody: < 0.2: Fluorescent Bead Immunoassy  Reference Ranges for RNP and SM:  <1.0 AI (negative)  > or =1.0 AI (positive)  Reference values apply to all ages ZZ    Smooth Muscle Antibody: &<1:20: Method: Indirect fluorescent antibody. (17 @ 22:08)    SUKHWINDER-1 Antibody: <0.2: Fluorescent Bead Immunoassay  Sukhwinder 1 Antibodies, IgG  <1.0 AI (negative)  > or =1.0 AI (positive)  Reference values apply to all ages. ZZ (17 @ 22:08)    Anti SS-A Antibody: 7.5 ZZ (17 @ 22:08)    Anti SS-A Antibody: 7.5 ZZ (17 @ 22:08)    Anti SS-B Antibody: <0.2: Fluorescent Bead Immunoassay  Reference Ranges for SS-A AND SS-B:  <1.0 AI (negative)  > or =1.0 AI (positive)  Reference values apply  to all ages. ZZ (17 @ 22:08)    Urinalysis Basic - ( 09 Sep 2017 18:35 )    Color: PLYEL / Appearance: CLEAR / S.007 / pH: 6.0  Gluc: NEGATIVE / Ketone: NEGATIVE  / Bili: NEGATIVE / Urobili: NORMAL E.U.   Blood: NEGATIVE / Protein: NEGATIVE / Nitrite: NEGATIVE   Leuk Esterase: NEGATIVE / RBC: 0-2 / WBC 0-2   Sq Epi: OCC / Non Sq Epi: x / Bacteria: x          RADIOLOGY & ADDITIONAL TESTS:

## 2017-09-11 NOTE — SWALLOW VFSS/MBS ASSESSMENT ADULT - RECOMMENDED CONSISTENCY
1. Regular solids with thin liquids, as tolerated  2. Small bites and small, single cup sips of thin liquids  3. Upright positioning during and ~30 min post meals  4. Slow rate of consumption

## 2017-09-11 NOTE — SWALLOW VFSS/MBS ASSESSMENT ADULT - ORAL PHASE
within functional limits Delayed oral transit time/Reduced anterior - posterior transport/Uncontrolled bolus / spillover in hypopharynx

## 2017-09-11 NOTE — PROGRESS NOTE ADULT - ASSESSMENT
This is a 52 year old female with no significant PMH who comes in for SOB and was found to have interstitial lung disease found on Ct chest in addition to elevated CPK and hyperpigmentation present on Bl MCP joints and around the eyes suspicious for Dermatomyositis    1) Interstitial Lung Disease, Hyperpigmented Nodules BL MCP/eyes, Elevated CPK - Rule out Dermatomyositis  -patient has no PMH, presented with worsening SOB  -CT chest shows pulmonary fibrosis, traction bronchiectasis, nonspecific interstitial pneumonitis, liver mass   -CXR: increased interstitial markings, strand lace opacities  -CPK 1200-->1143, elevated CKMB, troponins( ACS ruled out)  -anti yakov negative, anti ssb <0.02, anti ssa 7.5 ( which is present in pulmonary fibrosis), RF 9.9, elevated IgG  -denies seeing a doctor in a long time, never had a mammogram or screening colonoscopy. Today patient stated that when she swallows, it feels like something is "stuck" in her throat.  Plan:  -continue with high dose steroids for now, will consider pulse dose steroids once further malignancy work up is done( mammmogram, colonoscopy)  -recommend to get GI consult to work up liver mass in addition to evaluate dysphagia and need for EGD and colonoscopy during this admission to evaluate for underlying malignancy.  -patient has clinical findings in addition to elevated CPK in the setting of intersitial lung disease without seen proximal muscle weakness.  -order IgG4 subset as patients IgG level was elevated   -consider pelvic sonogram  to screen for any masses  -continue to monitor for signs of muscle weakness in addition to worsening lung disease  -no need to order an aldolase as patients CPK is already elevated, wouldn't give too much clinical benefit. This is a 52 year old female with no significant PMH who comes in for SOB and was found to have interstitial lung disease found on Ct chest in addition to elevated CPK and rash suspicious for Dermatomyositis    Patient with rash consistent with dermatomyositis (heliotrope rash and Gottron's papules), interstitial lung disease, dysphagia and incidentally found liver mass.  Etiology of liver mass is unclear, would consider MRI of the abdomen to further characterize  GI evaluation to rule out esophageal mass given dysphagia. consider EGD/colonscopy  At present, there is no evidence of active myositis with the exception of possible dysphagia  will continue with current dose of steroids for now given mild improvement; may consider pulse dose steroids pending Quanti mary on-TB gold  mammogram/PAP smear as outpatient  consider pelvic US  elevated IgG level, consider subsets  will follow with you closely

## 2017-09-11 NOTE — SWALLOW VFSS/MBS ASSESSMENT ADULT - DEMONSTRATES NEED FOR REFERRAL TO ANOTHER SERVICE
Registered Dietitian/To facilitate adequate daily caloric intake; ENT to assess vocal folds due to patient report of changes in her vocal quality. To facilitate adequate daily caloric intake; ENT to assess vocal folds due to patient report of changes in her vocal quality; Neuro consult to r/o neurological component given new onset of dysphagia./Registered Dietitian

## 2017-09-11 NOTE — SWALLOW VFSS/MBS ASSESSMENT ADULT - RECOMMENDED FEEDING/EATING TECHNIQUES
alternate food with liquid/position upright (90 degrees)/allow for swallow between intakes/maintain upright posture during/after eating for 30 mins/small sips/bites/hard swallow w/ each bite or sip/no straws/oral hygiene/provide rest periods between swallows

## 2017-09-11 NOTE — PROGRESS NOTE ADULT - ASSESSMENT
1) Pulmonary fibrosis - possible NSIP, f/u rheumatology work up, f/u pulmonary r/o dermatomyosistis vs anti synthetase syndrome, on high dose steroids    2) Liver mass - consider GI consult , MRI abd    3) Elevated troponins - unlikely ACS D/C HEPARIN, 2d echo shows normal LV function

## 2017-09-11 NOTE — PROGRESS NOTE ADULT - ATTENDING COMMENTS
ILD associated with DM  No evidence of infection  D/C abx.  Continue steroids.  PCP prophylaxis.  Quant gold testing  TTE  Rheum f/u appreciated

## 2017-09-12 DIAGNOSIS — K62.89 OTHER SPECIFIED DISEASES OF ANUS AND RECTUM: ICD-10-CM

## 2017-09-12 DIAGNOSIS — R13.19 OTHER DYSPHAGIA: ICD-10-CM

## 2017-09-12 DIAGNOSIS — R06.02 SHORTNESS OF BREATH: ICD-10-CM

## 2017-09-12 LAB
BUN SERPL-MCNC: 23 MG/DL — SIGNIFICANT CHANGE UP (ref 7–23)
CALCIUM SERPL-MCNC: 9.5 MG/DL — SIGNIFICANT CHANGE UP (ref 8.4–10.5)
CCP AB SER-ACNC: <8 — SIGNIFICANT CHANGE UP
CHLORIDE SERPL-SCNC: 101 MMOL/L — SIGNIFICANT CHANGE UP (ref 98–107)
CO2 SERPL-SCNC: 25 MMOL/L — SIGNIFICANT CHANGE UP (ref 22–31)
CREAT SERPL-MCNC: 0.78 MG/DL — SIGNIFICANT CHANGE UP (ref 0.5–1.3)
DSDNA AB SER-ACNC: <12 IU/ML — SIGNIFICANT CHANGE UP
GAS PNL BLDMV: SIGNIFICANT CHANGE UP
GAS PNL BLDMV: SIGNIFICANT CHANGE UP
GLUCOSE SERPL-MCNC: 110 MG/DL — HIGH (ref 70–99)
HCT VFR BLD CALC: 46.7 % — HIGH (ref 34.5–45)
HGB BLD-MCNC: 14.6 G/DL — SIGNIFICANT CHANGE UP (ref 11.5–15.5)
MCHC RBC-ENTMCNC: 25.9 PG — LOW (ref 27–34)
MCHC RBC-ENTMCNC: 31.3 % — LOW (ref 32–36)
MCV RBC AUTO: 82.9 FL — SIGNIFICANT CHANGE UP (ref 80–100)
NRBC # FLD: 0 — SIGNIFICANT CHANGE UP
PLATELET # BLD AUTO: 204 K/UL — SIGNIFICANT CHANGE UP (ref 150–400)
PMV BLD: 10.9 FL — SIGNIFICANT CHANGE UP (ref 7–13)
POTASSIUM SERPL-MCNC: 4.3 MMOL/L — SIGNIFICANT CHANGE UP (ref 3.5–5.3)
POTASSIUM SERPL-SCNC: 4.3 MMOL/L — SIGNIFICANT CHANGE UP (ref 3.5–5.3)
RBC # BLD: 5.63 M/UL — HIGH (ref 3.8–5.2)
RBC # FLD: 14.6 % — HIGH (ref 10.3–14.5)
SODIUM SERPL-SCNC: 140 MMOL/L — SIGNIFICANT CHANGE UP (ref 135–145)
WBC # BLD: 12.28 K/UL — HIGH (ref 3.8–10.5)
WBC # FLD AUTO: 12.28 K/UL — HIGH (ref 3.8–10.5)

## 2017-09-12 PROCEDURE — 99233 SBSQ HOSP IP/OBS HIGH 50: CPT | Mod: GC

## 2017-09-12 PROCEDURE — 99232 SBSQ HOSP IP/OBS MODERATE 35: CPT | Mod: GC

## 2017-09-12 RX ORDER — PANTOPRAZOLE SODIUM 20 MG/1
40 TABLET, DELAYED RELEASE ORAL
Qty: 0 | Refills: 0 | Status: DISCONTINUED | OUTPATIENT
Start: 2017-09-12 | End: 2017-09-15

## 2017-09-12 RX ORDER — AER TRAVELER 0.5 G/1
1 SOLUTION RECTAL; TOPICAL
Qty: 0 | Refills: 0 | Status: DISCONTINUED | OUTPATIENT
Start: 2017-09-12 | End: 2017-09-15

## 2017-09-12 RX ORDER — SOD SULF/SODIUM/NAHCO3/KCL/PEG
1 SOLUTION, RECONSTITUTED, ORAL ORAL EVERY 4 HOURS
Qty: 0 | Refills: 0 | Status: COMPLETED | OUTPATIENT
Start: 2017-09-12 | End: 2017-09-12

## 2017-09-12 RX ADMIN — Medication 1 LITER(S): at 21:36

## 2017-09-12 RX ADMIN — Medication 50 MILLIGRAM(S): at 05:14

## 2017-09-12 RX ADMIN — Medication 10 MILLIGRAM(S): at 21:36

## 2017-09-12 RX ADMIN — Medication 10 MILLIGRAM(S): at 17:10

## 2017-09-12 RX ADMIN — Medication 81 MILLIGRAM(S): at 11:33

## 2017-09-12 RX ADMIN — Medication 1 LITER(S): at 17:11

## 2017-09-12 NOTE — CONSULT NOTE ADULT - SUBJECTIVE AND OBJECTIVE BOX
Chief Complaint:  Patient is a 52y old  Female who presents with a chief complaint of Shortness of breath (08 Sep 2017 13:20)    No pertinent past medical history  No significant past surgical history     HPI:  51 y/o female presents to the ED with shortness of breath. The patient reports that she has been experiencing worsening PYLE for the past 2 weeks. The PYLE somewhat subsides with rest and is associated with PND and 2-pillow orthopnea. At baseline, the patient has always been able to walk for several blocks without developing symptoms, but can only walk a half block now before feeling SOB. Dyspnea has been accompanied by a persistent cough that is productive of white sputum and worse during exertion. Tussive episodes are painful and cause a dull pain on the left side of her neck and mid-sternal chest that subsides with resolution of coughing. the patient reports no dysphagia to food, solids or liquids, but reports a sensation of "something stuck in the side of my neck when walking causing me shortness of breath". The patient also reports no abdominal discomfort, but feels some rectal discomfort when having bm's for which she has a known hemorrhoid that protrudes with straining. Denies ever having EGD/Colonoscopy in the past and "rarely sees doctors" The patient denies dizziness, numbness/tingling, weakness, syncope, diaphoresis, lower extremity edema, changes in weight, dysuria, melena, BRBPR, abdominal pain, constipation, and N/V/D, odynophagia or dysphagia to food or solids      No Known Allergies      aspirin enteric coated 81 milliGRAM(s) Oral daily  methylPREDNISolone sodium succinate Injectable 50 milliGRAM(s) IV Push daily  acetaminophen   Tablet. 650 milliGRAM(s) Oral every 6 hours PRN  simethicone 80 milliGRAM(s) Chew every 6 hours PRN  trimethoprim  160 mG/sulfamethoxazole 800 mG 1 Tablet(s) Oral <User Schedule>  guaiFENesin    Syrup 100 milliGRAM(s) Oral every 6 hours PRN  polyethylene glycol/electrolyte Solution 1 Liter(s) Oral every 4 hours  bisacodyl 10 milliGRAM(s) Oral every 4 hours  witch hazel Pads 1 Application(s) Topical four times a day PRN  pantoprazole    Tablet 40 milliGRAM(s) Oral before breakfast        FAMILY HISTORY:  No pertinent family history in first degree relatives        Review of Systems:    General:  No wt loss, fevers, chills, night sweats, fatigue  Eyes:  Good vision, no reported pain  ENT:  No sore throat, pain, runny nose, dysphagia  CV:  No pain, palpitations, no lightheadedness  Resp:  No dyspnea, cough, tachypnea, wheezing  GI: hemorrhoid, rectal pain, "something stuck in my neck"  :  No pain, bleeding, incontinence, nocturia  Muscle:  No pain, weakness  Neuro:  No weakness, tingling, memory problems  Psych:  No fatigue, insomnia, mood problems, depression  Endocrine:  No polyuria, polydypsia, cold/heat intolerance  Heme:  No petechiae, ecchymosis, easy bruisability  Skin:  No rash, tattoos, scars, edema    Relevant Family History:   n/c    Relevant Social History: n/c      Physical Exam:    Vital Signs:  Vital Signs Last 24 Hrs  T(C): 36.7 (12 Sep 2017 13:58), Max: 36.7 (12 Sep 2017 13:58)  T(F): 98 (12 Sep 2017 13:58), Max: 98 (12 Sep 2017 13:58)  HR: 80 (12 Sep 2017 13:58) (73 - 84)  BP: 124/87 (12 Sep 2017 13:58) (111/69 - 124/87)  BP(mean): --  RR: 18 (12 Sep 2017 13:58) (18 - 18)  SpO2: 98% (12 Sep 2017 13:58) (98% - 100%)  Daily     Daily Weight in k.9 (12 Sep 2017 06:48)    General:  Appears stated age, well-groomed, nad  HEENT:  NC/AT,  conjunctivae clear and pink, no thyromegaly, nodules, adenopathy, no JVD  Chest:  Full & symmetric excursion, no increased effort, breath sounds clear  Cardiovascular:  Regular rhythm, S1, S2, no murmur/rub/S3/S4, no abdominal bruit, no edema  Abdomen:  Soft, non-tender, non-distended, normoactive bowel sounds,  no masses ,no hepatosplenomeagaly, no signs of chronic liver disease  Extremities:  no cyanosis,clubbing or edema  Skin:  No rash/erythema/ecchymoses/petechiae/wounds/abscess/warm/dry  Neuro/Psych:  A&Ox3  , no asterixis, no tremor, no encephalopathy    Laboratory:                            14.6   12.28 )-----------( 204      ( 12 Sep 2017 06:10 )             46.7         140  |  101  |  23  ----------------------------<  110<H>  4.3   |  25  |  0.78    Ca    9.5      12 Sep 2017 06:10              Imaging:    < from: CT Abdomen and Pelvis w/ IV Cont (09.10.17 @ 13:34) >    EXAM:  CT ABDOMEN AND PELVIS IC        PROCEDURE DATE:  Sep 10 2017         INTERPRETATION:  CLINICAL INFORMATION: Abdominal pain. Evaluate for   malignancy. Interstitial lung disease.    COMPARISON: CT chest 2017.    PROCEDURE:   CT of the Abdomen and Pelvis was performed with intravenous contrast.   Intravenous contrast: 90 ml Omnipaque 350. 10 ml discarded.  Oral contrast: None.  Sagittal and coronal reformats were performed.    FINDINGS:    LOWER CHEST: Redemonstration of traction bronchiectasis and peripheral   reticular markings in the bilateral lower lobes consistent with   interstitial lung disease and pulmonary fibrosis. No evidence of   honeycombing.    LIVER: Small calcified granulomas. No mass visualized.  BILE DUCTS: Normalcaliber.  GALLBLADDER: Within normal limits.  SPLEEN: Within normal limits.  PANCREAS: Within normal limits.  ADRENALS: Within normal limits.  KIDNEYS/URETERS: Bilateral hypodense foci too small to characterize.    BLADDER: Within normal limits.  REPRODUCTIVE ORGANS: The uterus and adnexa are within normal limits.    BOWEL: No bowel obstruction. Appendix is normal.  PERITONEUM: No ascites.  VESSELS:  Within normal limits.  RETROPERITONEUM: No lymphadenopathy.    ABDOMINAL WALL: Within normal limits.  BONES: Within normal limits.    IMPRESSION:   No intra-abdominal mass.    Redemonstration of bilateral lower lobe findings consistent with   interstitial lung disease and pulmonary fibrosis.              ______________      Swallow VFSS/MBS Assessment Adult:   General Information:  · Patient Profile Review	yes	  · H & P Review	yes	  · Specify reason(s)	To objectively assess swallow function	  · Pertinent History of Current Problem	r/o dysphagia	  · Precautions/Limitations: Hearing	within functional limits	  · Precautions/Limitations: Vision	within functional limits	  · Comments	The patient was received in the radiology suite this AM, at which time she was alert and cooperative. Patient is reporting pharyngeal stasis on the L side for ~1 month. In addition, patient reporting decreased PO intake and changes in her vocal quality. Per charting, the patient is a 51 y/o F who presented to [facility] with "PYLE x 2 weeks, cough/ CP, on Tele for elevated CE's started on a Heparin gtt." Recent CT of the chest revealed, "Interstitial lung disease with traction bronchiectasis more prominent at the lung bases is suggestive of pulmonary fibrosis. The above findings are suggestive of nonspecific interstitial pneumonitis (NSIP) pattern." Discussed results and recommendations from this evaluation with the patient and call out to MD.	  	 The patient was received in the radiology suite this AM, at which time she was alert and cooperative. Patient is reporting pharyngeal stasis on the L side for ~1 month. In addition, patient reporting decreased PO intake. Per charting, the patient is a 51 y/o F who presented to [facility] with "PYLE x 2 weeks, cough/ CP, on Tele for elevated CE's started on a Heparin gtt." Recent CT of the chest revealed, "Interstitial lung disease with traction bronchiectasis more prominent at the lung bases is suggestive of pulmonary fibrosis. The above findings are suggestive of nonspecific interstitial pneumonitis (NSIP) pattern." Discussed results and recommendations from this evaluation with the patient and call out to MD. 	    Past Medical History:   · 	No pertinent past medical history: Entered Date: 08-Sep-2017 07:50, Entered By: Niya Jimenez, Last Modified By: Hugo RTBIJAN    Past Surgical History:   · 	No significant past surgical history: Entered By: Niya Jimenez, Entered Date: 08-Sep-2017 07:50, Last Modified By: Hugo RTIF    Preliminary Fluoroscopy:  · Baseline Swallow	Functional baseline swallow	  · Velopharyngeal movement	Adequate	    VFSS/MBS Eval: Trial 1  · Mode of Presentation	spoon; self fed	  · Consistencies administered	puree	  · Positioning	Lateral	    Oral Prep Phase:  · Oral Preparatory Phase	Functional	    Oral Phase:  · Oral Phase	within functional limits	    Pharyngeal Phase:  · Intact	No	  · Inadequate hyolaryngeal elevation	Mild	  · Incomplete epiglottic deflection	Mild	  · Delayed pharyngeal transit time	Mild	  · Residue along the base of the tongue	Trace	  · Residue in valleculae	Trace	    Rosenbek's Penetration Aspiration Scale:  · Rosenbek's Penetration Aspiration Scale	(1) no aspiration, contrast does not enter airway	    VFSS/MBS Eval: Trial 2  · Mode of Presentation	spoon; self fed	  · Consistencies administered	solid	  · Positioning	Lateral	    Oral Prep Phase:  · Oral Preparatory Phase	Functional	    Oral Phase:  · Oral Phase	within functional limits	    Pharyngeal Phase:  · Intact	No	  · Inadequate hyolaryngeal elevation	Mild	  · Incomplete epiglottic deflection	Mild	  · Delayed pharyngeal transit time	Mild	  · Residue along the base of the tongue	Trace	  · Residue in valleculae	Trace	    Rosenbek's Penetration Aspiration Scale:  · Rosenbek's Penetration Aspiration Scale	(1) no aspiration, contrast does not enter airway	    VFSS/MBS Eval: Trial 3  · Mode of Presentation	cup; self fed	  · Consistencies administered	nectar thick	  · Positioning	Lateral	    Oral Prep Phase:  · Oral Preparatory Phase	Functional	    Oral Phase:  · Oral Phase	within functional limits	    Pharyngeal Phase:  · Intact	No	  · Inadequate hyolaryngeal elevation	Mild	  · Incomplete epiglottic deflection	Mild	  · Delayed pharyngeal transit time	Mild	    Rosenbek's Penetration Aspiration Scale:  · Rosenbek's Penetration Aspiration Scale	(1) no aspiration, contrast does not enter airway	    VFSS/MBS Eval: Trial 4  · Mode of Presentation	cup; self fed	  · Consistencies administered	thin liquid	  · Positioning	Lateral	    Oral Prep Phase:  · Oral Preparatory Phase	Functional	    Oral Phase:  · Oral Phase	Delayed oral transit time; Reduced anterior - posterior transport; Uncontrolled bolus / spillover in hypopharynx	    Pharyngeal Phase:  · Intact	No	  · Inadequate hyolaryngeal elevation	Mild; Moderate	  · Incomplete epiglottic deflection	Mild; Moderate	  · Delayed pharyngeal transit time	Mild; Moderate	  · Laryngeal penetration during the swallow - silent	Mild; Moderate	  · Residue along the base of the tongue	Trace	  · Residue in valleculae	Trace	    Rosenbek's Penetration Aspiration Scale:  · Rosenbek's Penetration Aspiration Scale	(2) contrast enters airway, remains above the vocal cords, no residue remains (penetration)	    Recommendations:   · Diagnostic Impressions	1. The patient demonstrated functional oral management of puree, solid, and nectar thick liquid textures marked by adequate bolus collection, transfer, and AP transit.  2. The patient demonstrated a mild oral dysphagia for thin liquids marked by delayed bolus collection and transfer with uncontrolled spillover to the hypopharynx.  3. The patient demonstrated a mild pharyngeal dysphagia for puree, solids, and nectar thick liquid textures marked by delayed pharyngeal swallow trigger with adequate base of tongue retraction, reduced epiglottic deflection, adequate hyolaryngeal elevation, and adequate pharyngeal contractility. Trace stasis noted along the base of tongue and in the vallecula which cleared with a spontaneous re-swallow. No laryngeal penetration/aspiration.  4. The patient demonstrated a mild-moderate pharyngeal dysphagia for thin liquid textures marked by delayed pharyngeal swallow trigger with adequate base of tongue retraction, reduced epiglottic deflection, reduced hyolaryngeal elevation, and adequate pharyngeal contractility. Trace laryngeal penetration with full retrieval observed on larger, consecutive cup sips of thin liquids. Laryngeal penetration was not reduplicated when the patient consumed small, single cup sips of thin liquids. Trace stasis noted along the base of tongue and in the vallecula which cleared with spontaneous re-swallows.	  · Recommended Consistencies	1. Regular solids with thin liquids, as tolerated 2. Small bites and small, single cup sips of thin liquids 3. Upright positioning during and ~30 min post meals 4. Slow rate of consumption	  · Recommended Feeding/Eating Techniques	allow for swallow between intakes; alternate food with liquid; hard swallow w/ each bite or sip; maintain upright posture during/after eating for 30 mins; no straws; oral hygiene; position upright (90 degrees); provide rest periods between swallows; small sips/bites	  · Feeding Assistance	no assistance needed	  · Aspiration Precautions	yes  micro aspiration	  · Monitor for Signs of Aspiration	change of breathing pattern; oral hygiene; position upright (90Y); cough; gurgly voice; fever; pneumonia; throat clearing; upper respiratory infection	  · Anticipated Discharge Disposition	To be determined	  · Demonstrates Need for Referral to Another Service	Registered Dietitian; To facilitate adequate daily caloric intake; ENT to assess vocal folds due to patient report of changes in her vocal quality; Neuro consult to r/o neurological component given new onset of dysphagia.	  · Additional Recommendations	This department to follow during this admission for swallowing therapy as schedule permits. Continued swallowing therapy upon discharge from [facility] is recommended (rehab vs home care vs [facility] outpatient clinic - [phone]).	  · The above findings were discussed with	Physician; Patient	      Electronic Signatures:  Temitope Bobby (Speech Pathologist)  (Signed 11-Sep-2017 09:14)  	Authored: Swallow VFSS/MBS Assessment Adult, Recommendations      Last Updated: 11-Sep-2017 09:14 by Temitope Bobby (Speech Pathologist)

## 2017-09-12 NOTE — PROGRESS NOTE ADULT - SUBJECTIVE AND OBJECTIVE BOX
INTERVAL HISTORY: pt is lying in bed seen in am comfortable, not in distress, no cp SOB same as before  	  MEDICATIONS:  aspirin enteric coated 81 milliGRAM(s) Oral daily    trimethoprim  160 mG/sulfamethoxazole 800 mG 1 Tablet(s) Oral <User Schedule>    guaiFENesin    Syrup 100 milliGRAM(s) Oral every 6 hours PRN    acetaminophen   Tablet. 650 milliGRAM(s) Oral every 6 hours PRN    simethicone 80 milliGRAM(s) Chew every 6 hours PRN  polyethylene glycol/electrolyte Solution 1 Liter(s) Oral every 4 hours  bisacodyl 10 milliGRAM(s) Oral every 4 hours  pantoprazole    Tablet 40 milliGRAM(s) Oral before breakfast      witch hazel Pads 1 Application(s) Topical four times a day PRN      PHYSICAL EXAM:  T(C): 36.7 (09-12-17 @ 13:58), Max: 36.7 (09-12-17 @ 13:58)  HR: 80 (09-12-17 @ 13:58) (73 - 84)  BP: 124/87 (09-12-17 @ 13:58) (111/69 - 124/87)  RR: 18 (09-12-17 @ 13:58) (18 - 18)  SpO2: 98% (09-12-17 @ 13:58) (98% - 100%)  Wt(kg): --  I&O's Summary        Appearance: Normal	  HEENT:   Normal oral mucosa, PERRL, EOMI	  Cardiovascular: Normal S1 S2, No JVD, No murmurs, No edema  Respiratory: b/l basilar crackles  Gastrointestinal:  Soft, Non-tender, + BS	  Extremities: Normal range of motion, No clubbing, cyanosis or edema                                    14.6   12.28 )-----------( 204      ( 12 Sep 2017 06:10 )             46.7     09-12    140  |  101  |  23  ----------------------------<  110<H>  4.3   |  25  |  0.78    Ca    9.5      12 Sep 2017 06:10      proBNP:   Lipid Profile:   HgA1c:   TSH:

## 2017-09-12 NOTE — PROGRESS NOTE ADULT - ATTENDING COMMENTS
TTE was normal.  Pt stable on room air.  Planned for EGD/colonoscopy, stable from a pulmonary standpt.  Also planned for MRI abdomen, pelvic sono, malig search

## 2017-09-12 NOTE — CONSULT NOTE ADULT - ASSESSMENT
52 year old female with no significant PMH who comes in for SOB and was found to have interstitial lung disease found on Ct chest in addition to elevated CPK and rash suspicious for Dermatomyositis. Patient with rash consistent with dermatomyositis (heliotrope rash and Gottron's papules), interstitial lung disease, dysphagia and incidentally found "Small calcified granulomas. No mass visualized" Speech and swallow obtained with normal findings.  r/o esophageal mass

## 2017-09-12 NOTE — CONSULT NOTE ADULT - PROBLEM SELECTOR RECOMMENDATION 3
- found to have interstitial lung disease found on CT chest   - on steroids  - would keep on ppi qd while on steroids for gi ppx  - pulmonary following/appreciated

## 2017-09-12 NOTE — PROGRESS NOTE ADULT - ASSESSMENT
1) Pulmonary fibrosis - possible NSIP, f/u rheumatology work up, f/u pulmonary r/o dermatomyosistis vs anti synthetase syndrome    2) Dysphagia - pt seen by GI, for possible EGD tomorrow r/o malignancy    3) Elevated troponins - unlikely ACS D/C HEPARIN, 2d echo shows normal LV function

## 2017-09-12 NOTE — CONSULT NOTE ADULT - PROBLEM SELECTOR RECOMMENDATION 9
- speech and swallow with no dysphagia and regular diet recommended  - aspiration precautions  - r/o esophageal mass given pt with feeling of something "stuck in neck"  - NPO p MN for EGD tomorrow if cleared by pulmonary

## 2017-09-12 NOTE — PROGRESS NOTE ADULT - ASSESSMENT
Patient is a 52 year old female with no significant PMH who comes in for increasing SOB ad was found to have pulmonary fibrosis and ILD on Ct in addition to physical finds of questionable gottrens papules over Bl MCP joints, heliotrope rash surrounds BL eyes suscpious for dermatomyositis.     1) Hyperpigmented rash, ILD, Pulmonary dibrosis, dysphagia- rule out Dermatomyositis.  -presented with SOB, found to have ILD and pulmonary fibrosis with traction bronchitectasis.  -anti SSA elevated( seen in Pulmonary fibrosis), igG elevated 1646, anti smith and RNP negative in addition to cultures.  -radiology for dysphagia showed no aspiration  -2d echo was WNL, Normal LVF, no right heart strain or signs of pulmonary HTN.  -physical findings consistent with oral hyperpigmentation, gottrens papules BL MCP, resolving heliotrope rash of BL eyes, no proximal muscle weakness. This is seen in Amyopathic DM where there are the skin findings without muscle weakness. Antisynthetase syndrome involved myositis, raynauds, constitutional symptoms,  hands, arthritis( usually non erosive) and ILD.   Plan:  -continue with solumedrol dose for now until quantierferon gold comes back to consider pulse dose steroids.  -pelvis ultrasound  -Patient is NPO post midnight for EGD/colonoscopy tomorrow to evaluate dyphagia and possible underlying malignancy?  -IgG subset as IgG was elevated  -will continue to monitor and follow course. Patient is a 52 year old female with no significant PMH who comes in for increasing SOB ad was found to have pulmonary fibrosis and ILD on Ct in addition to physical finds of questionable gottrens papules over Bl MCP joints, heliotrope rash surrounds BL eyes suspicious for dermatomyositis.     1) Hyperpigmented rash, ILD, Pulmonary fibrosis, dysphagia- rule out Dermatomyositis.  -presented with SOB, found to have ILD and pulmonary fibrosis with traction bronchiectasis.  -anti SSA elevated( seen in Pulmonary fibrosis), igG elevated 1646, anti smith and RNP negative in addition to cultures.  -radiology for dysphagia showed no aspiration  -2d echo was WNL, Normal LVF, no right heart strain or signs of pulmonary HTN.  -physical findings consistent with oral hyperpigmentation, gottrens papules BL MCP, resolving heliotrope rash of BL eyes, no proximal muscle weakness. This is seen in Amyopathic DM where there are the skin findings without muscle weakness. Antisynthetase syndrome involved myositis, raynauds, constitutional symptoms,  hands, arthritis( usually non erosive) and ILD.   Plan:  -continue with solumedrol dose for now until quantiferon gold comes back to consider pulse dose steroids.  -pelvis ultrasound  -Patient is NPO post midnight for EGD/colonoscopy tomorrow to evaluate dysphagia and possible underlying malignancy?  -IgG subset as IgG was elevated  -follow up MRI liver for liver mass evaluation  -CK in the am  -changed steroids to 60 IV for dosing of 1mg/kg  -recommend walking patient to see if she desaturates upon ambulation.  -will continue to monitor and follow course.

## 2017-09-12 NOTE — PROGRESS NOTE ADULT - PROBLEM SELECTOR PLAN 5
-Rheumatology work up in progress to determine if patient has dermatomyositis vs. antisynthetase  - f/u MRI abd  - pelvis US  NPO for EGD/colonscopy in am r/o Ca

## 2017-09-12 NOTE — PROGRESS NOTE ADULT - PROBLEM SELECTOR PLAN 2
Pulmonary Fibrosis on CT Chest  still productive cough  - c/w Solu-Medrol  - c/w Bactrim ppx for PCP  - monitor respiratory status closely

## 2017-09-12 NOTE — CONSULT NOTE ADULT - PROBLEM SELECTOR RECOMMENDATION 2
- r/o hemorrhoids vs rectal mass  - anusol suppository prn  - tucks pads  - CLD  - bowel prep ordered  - NPO p MN for Colonoscopy tomorrow if cleared by pulmonary team

## 2017-09-12 NOTE — PROGRESS NOTE ADULT - SUBJECTIVE AND OBJECTIVE BOX
ALIS SARAVIA  [id number]    INTERVAL HPI/OVERNIGHT EVENTS:  no acute events over night     MEDICATIONS  (STANDING):  aspirin enteric coated 81 milliGRAM(s) Oral daily  methylPREDNISolone sodium succinate Injectable 50 milliGRAM(s) IV Push daily  trimethoprim  160 mG/sulfamethoxazole 800 mG 1 Tablet(s) Oral <User Schedule>  polyethylene glycol/electrolyte Solution 1 Liter(s) Oral every 4 hours  bisacodyl 10 milliGRAM(s) Oral every 4 hours  pantoprazole    Tablet 40 milliGRAM(s) Oral before breakfast    MEDICATIONS  (PRN):  acetaminophen   Tablet. 650 milliGRAM(s) Oral every 6 hours PRN Mild Pain (1 - 3)  simethicone 80 milliGRAM(s) Chew every 6 hours PRN Gas  guaiFENesin    Syrup 100 milliGRAM(s) Oral every 6 hours PRN Cough  witch hazel Pads 1 Application(s) Topical four times a day PRN hemorrhoids      Allergies    No Known Allergies    Intolerances        Review of Systems:   General: No fevers/chills, no fatigue  HEENT: No blurry vision, dysphagia, or odynophagia  CVS: No CP/palpitations  Resp: No SOB/wheezing  GI: No N/V/C/D/abdominal pain  MSK:   Skin: No new rashes  Neuro: No headaches      Vital Signs Last 24 Hrs  T(C): 36.7 (12 Sep 2017 13:58), Max: 36.7 (12 Sep 2017 13:58)  T(F): 98 (12 Sep 2017 13:58), Max: 98 (12 Sep 2017 13:58)  HR: 80 (12 Sep 2017 13:58) (73 - 84)  BP: 124/87 (12 Sep 2017 13:58) (111/69 - 124/87)  BP(mean): --  RR: 18 (12 Sep 2017 13:58) (18 - 18)  SpO2: 98% (12 Sep 2017 13:58) (98% - 100%)    Physical Exam:  General: NAD, standing up adjusting hair ,AAOX3  HEENT: EOMI, MMM, oral hyperpigmentation present over BL cheeks and anterolateral tongue.  Cardio: +S1/S2, RRR  Resp: CTA, slight velcro /coarse breath sounds BL with decreased entry over bases BL  GI: +BS, soft, NT/ND  MSK:no synovitis, possible gottrens papules over MCPs BL, heliotrope rash BL eyes -resolving, no signs of erthyroderma or calcinosis cutis, no poikilderma or holster sign noticed. 5/5 strength in UE and LE, no proximal muscle weakness, no problems standing from a sedimentary position.   Neuro: AAOx3  Psych: wnl, pleasant mood     LABS:                        14.6   12.28 )-----------( 204      ( 12 Sep 2017 06:10 )             46.7         140  |  101  |  23  ----------------------------<  110<H>  4.3   |  25  |  0.78    Ca    9.5      12 Sep 2017 06:10    Quantitative Ig mg/dL (17 @ 17:10)    ALLAN - RNP Antibody: < 0.2: Fluorescent Bead Immunoassy  Reference Ranges for RNP and SM:  <1.0 AI (negative)  > or =1.0 AI (positive)  Reference values apply to all ages ZZ (17 @ 17:10)    ALLAN - Smith Antibody: < 0.2 ZZ (17 @ 17:10)    Culture - Blood:   NO ORGANISMS ISOLATED  NO ORGANISMS AT 72 HRS. (17 @ 00:21)    Hepatitis C Virus Interpretation: Nonreactive Hepatitis C AB  S/CO Ratio                        Interpretation  < 1.0                                     Non-Reactive  1.0 - 4.9                           Weakly-Reactive  > 5.0                                 Reactive  Non-Reactive: Aperson with a non-reactive HCV antibody  result is considered uninfected.  No further action is  needed unless recent infection is suspected.  In these  cases, consider repeat testing later to detect  seroconversion..  Weakly-Reactive: HCV antibody test is abnormal, HCV RNA  Qualitative test will follow.  Reactive: HCV antibody test is abnormal, HCV RNA  Qualitative test will follow.  Note: HCV antibody testing is performed on the [employer] system. (17 @ 17:10)    Chlamydia pneumoniae (RapRVP): NOT DETECTED (17 @ 07:53)    Adenovirus (RapRVP): NOT DETECTED (17 @ 07:53)    Influenza A (RapRVP): NOT DETECTED (any subtype) (17 @ 07:53)      RADIOLOGY & ADDITIONAL TESTS:  < from: CT Abdomen and Pelvis w/ IV Cont (09.10.17 @ 13:34) >  No intra-abdominal mass.    Redemonstration of bilateral lower lobe findings consistent with   interstitial lung disease and pulmonary fibrosis.    < end of copied text > ALIS SARAVIA  [id number]    INTERVAL HPI/OVERNIGHT EVENTS:  no acute events over night. Pt reports feeling well and is sleeping well. Has no new complaints.     MEDICATIONS  (STANDING):  aspirin enteric coated 81 milliGRAM(s) Oral daily  methylPREDNISolone sodium succinate Injectable 50 milliGRAM(s) IV Push daily  trimethoprim  160 mG/sulfamethoxazole 800 mG 1 Tablet(s) Oral <User Schedule>  polyethylene glycol/electrolyte Solution 1 Liter(s) Oral every 4 hours  bisacodyl 10 milliGRAM(s) Oral every 4 hours  pantoprazole    Tablet 40 milliGRAM(s) Oral before breakfast    MEDICATIONS  (PRN):  acetaminophen   Tablet. 650 milliGRAM(s) Oral every 6 hours PRN Mild Pain (1 - 3)  simethicone 80 milliGRAM(s) Chew every 6 hours PRN Gas  guaiFENesin    Syrup 100 milliGRAM(s) Oral every 6 hours PRN Cough  witch hazel Pads 1 Application(s) Topical four times a day PRN hemorrhoids      Allergies    No Known Allergies    Intolerances        Review of Systems:   General: No fevers/chills, no fatigue  HEENT: No blurry vision, continued difficulty swallowing when exerting herself a/w SOB  CVS: No CP/palpitations  Resp: +SOB w/ +PYLE  GI: No N/V/C/D/abdominal pain  MSK: No joint pains  Skin: No new rashes  Neuro: No headaches      Vital Signs Last 24 Hrs  T(C): 36.7 (12 Sep 2017 13:58), Max: 36.7 (12 Sep 2017 13:58)  T(F): 98 (12 Sep 2017 13:58), Max: 98 (12 Sep 2017 13:58)  HR: 80 (12 Sep 2017 13:58) (73 - 84)  BP: 124/87 (12 Sep 2017 13:58) (111/69 - 124/87)  BP(mean): --  RR: 18 (12 Sep 2017 13:58) (18 - 18)  SpO2: 98% (12 Sep 2017 13:58) (98% - 100%)    Physical Exam:  General: NAD, standing up adjusting hair ,AAOX3  HEENT: EOMI, MMM, oral hyperpigmentation present over BL cheeks and anterolateral tongue.  Cardio: +S1/S2, RRR  Resp: CTA, slightly coarse breath sounds w/ expiratory crackles BL with decreased entry over bases BL  GI: +BS, soft, NT/ND  MSK:no synovitis, possible Gottrens papules over MCPs BL, heliotrope rash BL eyes -resolving, no signs of erthyroderma or calcinosis cutis, no poikilderma or holster sign noticed. 5/5 strength in UE and LE, no proximal muscle weakness, no problems standing from a sedimentary position.   Neuro: AAOx3  Psych: wnl, pleasant mood     LABS:                        14.6   12.28 )-----------( 204      ( 12 Sep 2017 06:10 )             46.7     -    140  |  101  |  23  ----------------------------<  110<H>  4.3   |  25  |  0.78    Ca    9.5      12 Sep 2017 06:10    Quantitative Ig mg/dL (17 @ 17:10)    ALLAN - RNP Antibody: < 0.2: Fluorescent Bead Immunoassy  Reference Ranges for RNP and SM:  <1.0 AI (negative)  > or =1.0 AI (positive)  Reference values apply to all ages ZZ (17 @ 17:10)    ALLAN - Smith Antibody: < 0.2 ZZ (17 @ 17:10)    Culture - Blood:   NO ORGANISMS ISOLATED  NO ORGANISMS AT 72 HRS. (17 @ 00:21)    Hepatitis C Virus Interpretation: Nonreactive Hepatitis C AB  S/CO Ratio                        Interpretation  < 1.0                                     Non-Reactive  1.0 - 4.9                           Weakly-Reactive  > 5.0                                 Reactive  Non-Reactive: Aperson with a non-reactive HCV antibody  result is considered uninfected.  No further action is  needed unless recent infection is suspected.  In these  cases, consider repeat testing later to detect  seroconversion..  Weakly-Reactive: HCV antibody test is abnormal, HCV RNA  Qualitative test will follow.  Reactive: HCV antibody test is abnormal, HCV RNA  Qualitative test will follow.  Note: HCV antibody testing is performed on the [employer]   system. (17 @ 17:10)    Chlamydia pneumoniae (RapRVP): NOT DETECTED (17 @ 07:53)    Adenovirus (RapRVP): NOT DETECTED (17 @ 07:53)    Influenza A (RapRVP): NOT DETECTED (any subtype) (17 @ 07:53)      RADIOLOGY & ADDITIONAL TESTS:  < from: CT Abdomen and Pelvis w/ IV Cont (09.10.17 @ 13:34) >  No intra-abdominal mass.    Redemonstration of bilateral lower lobe findings consistent with   interstitial lung disease and pulmonary fibrosis.    < end of copied text >

## 2017-09-12 NOTE — PROGRESS NOTE ADULT - SUBJECTIVE AND OBJECTIVE BOX
CHIEF COMPLAINT:    Interval Events:    REVIEW OF SYSTEMS:  Constitutional:   Eyes:  ENT:  CV:  Resp:  GI:  :  MSK:  Integumentary:  Neurological:  Psychiatric:  Endocrine:  Hematologic/Lymphatic:  Allergic/Immunologic:  [ ] All other systems negative  [ ] Unable to assess ROS because ________    OBJECTIVE:  ICU Vital Signs Last 24 Hrs  T(C): 36.4 (12 Sep 2017 05:13), Max: 36.8 (11 Sep 2017 13:30)  T(F): 97.6 (12 Sep 2017 05:13), Max: 98.2 (11 Sep 2017 13:30)  HR: 73 (12 Sep 2017 05:13) (73 - 100)  BP: 111/69 (12 Sep 2017 05:13) (111/69 - 127/88)  BP(mean): --  ABP: --  ABP(mean): --  RR: 18 (12 Sep 2017 05:13) (17 - 18)  SpO2: 100% (12 Sep 2017 05:13) (100% - 100%)        CAPILLARY BLOOD GLUCOSE          PHYSICAL EXAM:  General:   HEENT:   Lymph Nodes:  Neck:   Respiratory:   Cardiovascular:   Abdomen:   Extremities:   Skin:   Neurological:  Psychiatry:    HOSPITAL MEDICATIONS:  MEDICATIONS  (STANDING):  aspirin enteric coated 81 milliGRAM(s) Oral daily  methylPREDNISolone sodium succinate Injectable 50 milliGRAM(s) IV Push daily  trimethoprim  160 mG/sulfamethoxazole 800 mG 1 Tablet(s) Oral <User Schedule>    MEDICATIONS  (PRN):  acetaminophen   Tablet. 650 milliGRAM(s) Oral every 6 hours PRN Mild Pain (1 - 3)  simethicone 80 milliGRAM(s) Chew every 6 hours PRN Gas  guaiFENesin    Syrup 100 milliGRAM(s) Oral every 6 hours PRN Cough      LABS:                        14.6   12.28 )-----------( 204      ( 12 Sep 2017 06:10 )             46.7     09-12    140  |  101  |  23  ----------------------------<  110<H>  4.3   |  25  |  0.78    Ca    9.5      12 Sep 2017 06:10                MICROBIOLOGY:     RADIOLOGY:  [ ] Reviewed and interpreted by me    PULMONARY FUNCTION TESTS:    EKG: CHIEF COMPLAINT: SOB    Interval Events: No events overnight    REVIEW OF SYSTEMS:    CV: no CP, no palpitations  Resp: + cough      [ x] All other systems negative  [ ] Unable to assess ROS because ________    OBJECTIVE:  ICU Vital Signs Last 24 Hrs  T(C): 36.4 (12 Sep 2017 05:13), Max: 36.8 (11 Sep 2017 13:30)  T(F): 97.6 (12 Sep 2017 05:13), Max: 98.2 (11 Sep 2017 13:30)  HR: 73 (12 Sep 2017 05:13) (73 - 100)  BP: 111/69 (12 Sep 2017 05:13) (111/69 - 127/88)  BP(mean): --  ABP: --  ABP(mean): --  RR: 18 (12 Sep 2017 05:13) (17 - 18)  SpO2: 100% (12 Sep 2017 05:13) (100% - 100%)        CAPILLARY BLOOD GLUCOSE          PHYSICAL EXAM:  General:   HEENT:   Lymph Nodes:  Neck:   Respiratory:   Cardiovascular:   Abdomen:   Extremities:   Skin:   Neurological:  Psychiatry:    HOSPITAL MEDICATIONS:  MEDICATIONS  (STANDING):  aspirin enteric coated 81 milliGRAM(s) Oral daily  methylPREDNISolone sodium succinate Injectable 50 milliGRAM(s) IV Push daily  trimethoprim  160 mG/sulfamethoxazole 800 mG 1 Tablet(s) Oral <User Schedule>    MEDICATIONS  (PRN):  acetaminophen   Tablet. 650 milliGRAM(s) Oral every 6 hours PRN Mild Pain (1 - 3)  simethicone 80 milliGRAM(s) Chew every 6 hours PRN Gas  guaiFENesin    Syrup 100 milliGRAM(s) Oral every 6 hours PRN Cough      LABS:                        14.6   12.28 )-----------( 204      ( 12 Sep 2017 06:10 )             46.7     09-12    140  |  101  |  23  ----------------------------<  110<H>  4.3   |  25  |  0.78    Ca    9.5      12 Sep 2017 06:10                MICROBIOLOGY:     RADIOLOGY:  [ ] Reviewed and interpreted by me    PULMONARY FUNCTION TESTS:    EKG:

## 2017-09-13 LAB
BASOPHILS # BLD AUTO: 0.04 K/UL — SIGNIFICANT CHANGE UP (ref 0–0.2)
BASOPHILS NFR BLD AUTO: 0.3 % — SIGNIFICANT CHANGE UP (ref 0–2)
BLD GP AB SCN SERPL QL: NEGATIVE — SIGNIFICANT CHANGE UP
BUN SERPL-MCNC: 23 MG/DL — SIGNIFICANT CHANGE UP (ref 7–23)
CALCIUM SERPL-MCNC: 10 MG/DL — SIGNIFICANT CHANGE UP (ref 8.4–10.5)
CHLORIDE SERPL-SCNC: 102 MMOL/L — SIGNIFICANT CHANGE UP (ref 98–107)
CK SERPL-CCNC: 336 U/L — HIGH (ref 25–170)
CO2 SERPL-SCNC: 24 MMOL/L — SIGNIFICANT CHANGE UP (ref 22–31)
CREAT SERPL-MCNC: 0.78 MG/DL — SIGNIFICANT CHANGE UP (ref 0.5–1.3)
EOSINOPHIL # BLD AUTO: 0 K/UL — SIGNIFICANT CHANGE UP (ref 0–0.5)
EOSINOPHIL NFR BLD AUTO: 0 % — SIGNIFICANT CHANGE UP (ref 0–6)
GLUCOSE SERPL-MCNC: 114 MG/DL — HIGH (ref 70–99)
HCG SERPL-ACNC: < 5 MIU/ML — SIGNIFICANT CHANGE UP
HCT VFR BLD CALC: 47.1 % — HIGH (ref 34.5–45)
HCT VFR BLD CALC: 47.1 % — HIGH (ref 34.5–45)
HGB BLD-MCNC: 14.9 G/DL — SIGNIFICANT CHANGE UP (ref 11.5–15.5)
HGB BLD-MCNC: 14.9 G/DL — SIGNIFICANT CHANGE UP (ref 11.5–15.5)
IGG1 SER-MCNC: 1040 MG/DL — HIGH (ref 341–894)
IGG2 SER-MCNC: 408 MG/DL — SIGNIFICANT CHANGE UP (ref 171–632)
IGG3 SER-MCNC: 114 MG/DL — HIGH (ref 18.4–106)
IGG4 SER-MCNC: 60.4 MG/DL — SIGNIFICANT CHANGE UP (ref 2.4–121)
IMM GRANULOCYTES # BLD AUTO: 0.09 # — SIGNIFICANT CHANGE UP
IMM GRANULOCYTES NFR BLD AUTO: 0.7 % — SIGNIFICANT CHANGE UP (ref 0–1.5)
LYMPHOCYTES # BLD AUTO: 26.8 % — SIGNIFICANT CHANGE UP (ref 13–44)
LYMPHOCYTES # BLD AUTO: 3.6 K/UL — HIGH (ref 1–3.3)
M TB TUBERC IFN-G BLD QL: 0 IU/ML — SIGNIFICANT CHANGE UP
M TB TUBERC IFN-G BLD QL: 0.05 IU/ML — SIGNIFICANT CHANGE UP
M TB TUBERC IFN-G BLD QL: NEGATIVE — SIGNIFICANT CHANGE UP
MCHC RBC-ENTMCNC: 26.7 PG — LOW (ref 27–34)
MCHC RBC-ENTMCNC: 26.7 PG — LOW (ref 27–34)
MCHC RBC-ENTMCNC: 31.6 % — LOW (ref 32–36)
MCHC RBC-ENTMCNC: 31.6 % — LOW (ref 32–36)
MCV RBC AUTO: 84.3 FL — SIGNIFICANT CHANGE UP (ref 80–100)
MCV RBC AUTO: 84.3 FL — SIGNIFICANT CHANGE UP (ref 80–100)
MITOGEN IGNF BCKGRD COR BLD-ACNC: 7.09 IU/ML — SIGNIFICANT CHANGE UP
MONOCYTES # BLD AUTO: 1.28 K/UL — HIGH (ref 0–0.9)
MONOCYTES NFR BLD AUTO: 9.5 % — SIGNIFICANT CHANGE UP (ref 2–14)
NEUTROPHILS # BLD AUTO: 8.41 K/UL — HIGH (ref 1.8–7.4)
NEUTROPHILS NFR BLD AUTO: 62.7 % — SIGNIFICANT CHANGE UP (ref 43–77)
NRBC # FLD: 0 — SIGNIFICANT CHANGE UP
NRBC # FLD: 0 — SIGNIFICANT CHANGE UP
PLATELET # BLD AUTO: 213 K/UL — SIGNIFICANT CHANGE UP (ref 150–400)
PLATELET # BLD AUTO: 213 K/UL — SIGNIFICANT CHANGE UP (ref 150–400)
PMV BLD: 10.8 FL — SIGNIFICANT CHANGE UP (ref 7–13)
PMV BLD: 10.8 FL — SIGNIFICANT CHANGE UP (ref 7–13)
POTASSIUM SERPL-MCNC: 3.6 MMOL/L — SIGNIFICANT CHANGE UP (ref 3.5–5.3)
POTASSIUM SERPL-SCNC: 3.6 MMOL/L — SIGNIFICANT CHANGE UP (ref 3.5–5.3)
RBC # BLD: 5.59 M/UL — HIGH (ref 3.8–5.2)
RBC # BLD: 5.59 M/UL — HIGH (ref 3.8–5.2)
RBC # FLD: 14.6 % — HIGH (ref 10.3–14.5)
RBC # FLD: 14.6 % — HIGH (ref 10.3–14.5)
RH IG SCN BLD-IMP: POSITIVE — SIGNIFICANT CHANGE UP
SODIUM SERPL-SCNC: 138 MMOL/L — SIGNIFICANT CHANGE UP (ref 135–145)
WBC # BLD: 13.42 K/UL — HIGH (ref 3.8–10.5)
WBC # BLD: 13.42 K/UL — HIGH (ref 3.8–10.5)
WBC # FLD AUTO: 13.42 K/UL — HIGH (ref 3.8–10.5)
WBC # FLD AUTO: 13.42 K/UL — HIGH (ref 3.8–10.5)

## 2017-09-13 PROCEDURE — 99233 SBSQ HOSP IP/OBS HIGH 50: CPT | Mod: GC

## 2017-09-13 PROCEDURE — 99232 SBSQ HOSP IP/OBS MODERATE 35: CPT | Mod: GC

## 2017-09-13 PROCEDURE — 76856 US EXAM PELVIC COMPLETE: CPT | Mod: 26

## 2017-09-13 PROCEDURE — 74183 MRI ABD W/O CNTR FLWD CNTR: CPT | Mod: 26

## 2017-09-13 RX ADMIN — Medication 1 TABLET(S): at 07:00

## 2017-09-13 RX ADMIN — Medication 60 MILLIGRAM(S): at 05:29

## 2017-09-13 RX ADMIN — PANTOPRAZOLE SODIUM 40 MILLIGRAM(S): 20 TABLET, DELAYED RELEASE ORAL at 05:30

## 2017-09-13 RX ADMIN — Medication 81 MILLIGRAM(S): at 11:16

## 2017-09-13 NOTE — PROGRESS NOTE ADULT - SUBJECTIVE AND OBJECTIVE BOX
INTERVAL HPI/OVERNIGHT EVENTS:    EGD/Colonoscopy cancelled as per anesthesia given not stable from pulmonary standpoint    MEDICATIONS  (STANDING):  aspirin enteric coated 81 milliGRAM(s) Oral daily  trimethoprim  160 mG/sulfamethoxazole 800 mG 1 Tablet(s) Oral <User Schedule>  pantoprazole    Tablet 40 milliGRAM(s) Oral before breakfast  methylPREDNISolone sodium succinate Injectable 60 milliGRAM(s) IV Push daily    MEDICATIONS  (PRN):  acetaminophen   Tablet. 650 milliGRAM(s) Oral every 6 hours PRN Mild Pain (1 - 3)  simethicone 80 milliGRAM(s) Chew every 6 hours PRN Gas  guaiFENesin    Syrup 100 milliGRAM(s) Oral every 6 hours PRN Cough  witch hazel Pads 1 Application(s) Topical four times a day PRN hemorrhoids      Allergies    No Known Allergies    Intolerances        Review of Systems:    General:  No wt loss, fevers, chills, night sweats, fatigue   Eyes:  Good vision, no reported pain  ENT:  No sore throat, pain, runny nose, dysphagia  CV:  No pain, palpitations, hypo/hypertension  Resp:  No dyspnea, cough, tachypnea, wheezing  GI:  No pain, No nausea, No vomiting, No diarrhea, No constipation, No weight loss, No fever, No pruritis, No rectal bleeding, No melena, No dysphagia  :  No pain, bleeding, incontinence, nocturia  Muscle:  No pain, weakness  Neuro:  No weakness, tingling, memory problems  Psych:  No fatigue, insomnia, mood problems, depression  Endocrine:  No polyuria, polydypsia, cold/heat intolerance  Heme:  No petechiae, ecchymosis, easy bruisability  Skin:  No rash, tattoos, scars, edema      Vital Signs Last 24 Hrs  T(C): 36.6 (13 Sep 2017 13:32), Max: 36.6 (13 Sep 2017 13:32)  T(F): 97.8 (13 Sep 2017 13:32), Max: 97.8 (13 Sep 2017 13:32)  HR: 90 (13 Sep 2017 13:32) (78 - 90)  BP: 123/84 (13 Sep 2017 13:32) (114/82 - 123/84)  BP(mean): --  RR: 16 (13 Sep 2017 13:32) (16 - 18)  SpO2: 100% (13 Sep 2017 13:32) (100% - 100%)    PHYSICAL EXAM:    Constitutional: NAD  HEENT: EOMI, throat clear  Neck: No LAD, supple  Respiratory: CTA and P  Cardiovascular: S1 and S2, RRR, no M  Gastrointestinal: BS+, soft, NT/ND, neg HSM,  Extremities: No peripheral edema, neg clubbing, cyanosis  Vascular: 2+ peripheral pulses  Neurological: A/O x 3, no focal deficits  Psychiatric: Normal mood, normal affect  Skin: No rashes      LABS:                        14.9   13.42 )-----------( 213      ( 13 Sep 2017 06:35 )             47.1     09-13    138  |  102  |  23  ----------------------------<  114<H>  3.6   |  24  |  0.78    Ca    10.0      13 Sep 2017 06:35            RADIOLOGY & ADDITIONAL TESTS:

## 2017-09-13 NOTE — PROGRESS NOTE ADULT - ASSESSMENT
1) Pulmonary fibrosis - possible NSIP, f/u rheumatology work up, f/u pulmonary r/o dermatomyosistis vs anti synthetase syndrome    2) Dysphagia - pt seen by GI, for EGD today    3) Elevated troponins - unlikely ACS D/C HEPARIN, 2d echo shows normal LV function

## 2017-09-13 NOTE — PROGRESS NOTE ADULT - PROBLEM SELECTOR PLAN 4
-Cinesophagram completed. May continue with regular diet  -Speech and swallow will f/u with exercises  -Endoscopy cancelled sec. to concerns with desat- pt to f.u as OP

## 2017-09-13 NOTE — PROGRESS NOTE ADULT - ASSESSMENT
51 y/o female presents to the ED with shortness of breath and chest pain.   Pt with Dermatomyositis /resp involvment Dx.  It is recommended that patient continue receiving 02 upon discharge.  Saturation levels for patient are as follows:  A.  On RA at rest : 92%   B. During exercise room air: 88%   C. On 02 @ 2L 94%

## 2017-09-13 NOTE — PROGRESS NOTE ADULT - PROBLEM SELECTOR PLAN 2
- r/o hemorrhoids vs rectal mass  - planned for Colonoscopy today, 9/13, however, per anesthesia not stable from a pulmonary standpoint  - anusol suppository prn  - tucks pads - r/o hemorrhoids vs rectal mass  - planned for Colonoscopy today, 9/13, however, per anesthesia not stable from a pulmonary standpoint-- rescheduled for tomorrow  - anusol suppository prn  - tucks pads

## 2017-09-13 NOTE — PROGRESS NOTE ADULT - SUBJECTIVE AND OBJECTIVE BOX
ALIS SARAVIA  [id number]    INTERVAL HPI/OVERNIGHT EVENTS:  No acute events overnight.     MEDICATIONS  (STANDING):  aspirin enteric coated 81 milliGRAM(s) Oral daily  trimethoprim  160 mG/sulfamethoxazole 800 mG 1 Tablet(s) Oral <User Schedule>  pantoprazole    Tablet 40 milliGRAM(s) Oral before breakfast  methylPREDNISolone sodium succinate Injectable 60 milliGRAM(s) IV Push daily    MEDICATIONS  (PRN):  acetaminophen   Tablet. 650 milliGRAM(s) Oral every 6 hours PRN Mild Pain (1 - 3)  simethicone 80 milliGRAM(s) Chew every 6 hours PRN Gas  guaiFENesin    Syrup 100 milliGRAM(s) Oral every 6 hours PRN Cough  witch hazel Pads 1 Application(s) Topical four times a day PRN hemorrhoids      Allergies    No Known Allergies    Intolerances        Review of Systems:   General: No fevers/chills, no fatigue  HEENT: No blurry vision, but still states to have dysphagia or a lump in her throat only when she becomes SOB during exertion.  CVS: No CP/palpitations  Resp: (+) SOB with exertion, relieved by rest  GI: No N/V/C/D/abdominal pain  MSK:  no muscle pain or weakness  Skin: No new rashes  Neuro: No headaches      Vital Signs Last 24 Hrs  T(C): 36.6 (13 Sep 2017 13:32), Max: 36.6 (13 Sep 2017 13:32)  T(F): 97.8 (13 Sep 2017 13:32), Max: 97.8 (13 Sep 2017 13:32)  HR: 90 (13 Sep 2017 13:32) (78 - 90)  BP: 123/84 (13 Sep 2017 13:32) (114/82 - 123/84)  BP(mean): --  RR: 16 (13 Sep 2017 13:32) (16 - 18)  SpO2: 100% (13 Sep 2017 13:32) (100% - 100%)    Physical Exam:  General: NAD, sitting up comfortably speaking in full sentences, AAOX3. Pleasant mood  HEENT: EOMI, MMM, hyperpigmented skin surrounding BL eyes- non tender on palpation.   Cardio: +S1/S2, RRR  Resp: decreased air entry BL with mild velcro sounding lung sounds particularly in upper lobes  GI: +BS, soft, NT/ND  MSK: no synovitis, redness or tenderness upon palpation, : hands, hyperpigmentation of BL MCPs in addition to extensor surfaces particularly right elbow and left wrist. No muscle weakness in UE or LE, 4/5 strength in LE and 5/5 in UE  Neuro: AAOx3  Psych: wnl    LABS:                        14.9   13.42 )-----------( 213      ( 13 Sep 2017 06:35 )    Creatine Kinase, Serum: 336: CKMB is no longer reflexively performed on elevated CK  results.  To get CKMB results please order "CK AND CKMB".  Effective Loretta 15, 2016. u/L (09.13.17 @ 06:35)                 09-13    138  |  102  |  23  ----------------------------<  114<H>  3.6   |  24  |  0.78    Ca    10.0      13 Sep 2017 06:35              RADIOLOGY & ADDITIONAL TESTS:

## 2017-09-13 NOTE — PROGRESS NOTE ADULT - PROBLEM SELECTOR PLAN 5
-Rheumatology work up in progress to determine if patient has dermatomyositis vs. antisynthetase  - f/u MRI abd  - pelvis US  -Endoscopy/colonoscopy cancelled sec to concerns by anesthesia with sob/hypoxia

## 2017-09-13 NOTE — PROGRESS NOTE ADULT - SUBJECTIVE AND OBJECTIVE BOX
CHIEF COMPLAINT:    Interval Events:    REVIEW OF SYSTEMS:  Constitutional:   Eyes:  ENT:  CV:  Resp:  GI:  :  MSK:  Integumentary:  Neurological:  Psychiatric:  Endocrine:  Hematologic/Lymphatic:  Allergic/Immunologic:  [ ] All other systems negative  [ ] Unable to assess ROS because ________    OBJECTIVE:  ICU Vital Signs Last 24 Hrs  T(C): 36.5 (13 Sep 2017 05:29), Max: 36.7 (12 Sep 2017 13:58)  T(F): 97.7 (13 Sep 2017 05:29), Max: 98 (12 Sep 2017 13:58)  HR: 78 (13 Sep 2017 05:29) (78 - 84)  BP: 117/74 (13 Sep 2017 05:29) (114/82 - 124/87)  BP(mean): --  ABP: --  ABP(mean): --  RR: 16 (13 Sep 2017 05:29) (16 - 18)  SpO2: 100% (13 Sep 2017 05:29) (98% - 100%)        CAPILLARY BLOOD GLUCOSE          PHYSICAL EXAM:  General:   HEENT:   Lymph Nodes:  Neck:   Respiratory:   Cardiovascular:   Abdomen:   Extremities:   Skin:   Neurological:  Psychiatry:    HOSPITAL MEDICATIONS:  MEDICATIONS  (STANDING):  aspirin enteric coated 81 milliGRAM(s) Oral daily  trimethoprim  160 mG/sulfamethoxazole 800 mG 1 Tablet(s) Oral <User Schedule>  pantoprazole    Tablet 40 milliGRAM(s) Oral before breakfast  methylPREDNISolone sodium succinate Injectable 60 milliGRAM(s) IV Push daily    MEDICATIONS  (PRN):  acetaminophen   Tablet. 650 milliGRAM(s) Oral every 6 hours PRN Mild Pain (1 - 3)  simethicone 80 milliGRAM(s) Chew every 6 hours PRN Gas  guaiFENesin    Syrup 100 milliGRAM(s) Oral every 6 hours PRN Cough  witch hazel Pads 1 Application(s) Topical four times a day PRN hemorrhoids      LABS:                        14.9   13.42 )-----------( 213      ( 13 Sep 2017 06:35 )             47.1     09-13    138  |  102  |  23  ----------------------------<  114<H>  3.6   |  24  |  0.78    Ca    10.0      13 Sep 2017 06:35                MICROBIOLOGY:     RADIOLOGY:  [ ] Reviewed and interpreted by me    PULMONARY FUNCTION TESTS:    EKG: CHIEF COMPLAINT: Patient is a 52y old  Female who presents with a chief complaint of Shortness of breath (08 Sep 2017 13:20)      Interval Events: None    REVIEW OF SYSTEMS:  Constitutional: No fever or chills   Eyes:  ENT:  CV:No chest pain   Resp: No sob, mild shah  GI:  :  MSK:  Integumentary:  Neurological:  Psychiatric:  Endocrine:  Hematologic/Lymphatic:  Allergic/Immunologic:  [x ] All other systems negative  [ ] Unable to assess ROS because ________    OBJECTIVE:  ICU Vital Signs Last 24 Hrs  T(C): 36.5 (13 Sep 2017 05:29), Max: 36.7 (12 Sep 2017 13:58)  T(F): 97.7 (13 Sep 2017 05:29), Max: 98 (12 Sep 2017 13:58)  HR: 78 (13 Sep 2017 05:29) (78 - 84)  BP: 117/74 (13 Sep 2017 05:29) (114/82 - 124/87)  BP(mean): --  ABP: --  ABP(mean): --  RR: 16 (13 Sep 2017 05:29) (16 - 18)  SpO2: 100% (13 Sep 2017 05:29) (98% - 100%)        CAPILLARY BLOOD GLUCOSE      HOSPITAL MEDICATIONS:  MEDICATIONS  (STANDING):  aspirin enteric coated 81 milliGRAM(s) Oral daily  trimethoprim  160 mG/sulfamethoxazole 800 mG 1 Tablet(s) Oral <User Schedule>  pantoprazole    Tablet 40 milliGRAM(s) Oral before breakfast  methylPREDNISolone sodium succinate Injectable 60 milliGRAM(s) IV Push daily    MEDICATIONS  (PRN):  acetaminophen   Tablet. 650 milliGRAM(s) Oral every 6 hours PRN Mild Pain (1 - 3)  simethicone 80 milliGRAM(s) Chew every 6 hours PRN Gas  guaiFENesin    Syrup 100 milliGRAM(s) Oral every 6 hours PRN Cough  witch hazel Pads 1 Application(s) Topical four times a day PRN hemorrhoids      LABS:                        14.9   13.42 )-----------( 213      ( 13 Sep 2017 06:35 )             47.1     09-13    138  |  102  |  23  ----------------------------<  114<H>  3.6   |  24  |  0.78    Ca    10.0      13 Sep 2017 06:35                MICROBIOLOGY:     RADIOLOGY:  [ ] Reviewed and interpreted by me    PULMONARY FUNCTION TESTS:    EKG:

## 2017-09-13 NOTE — PROGRESS NOTE ADULT - PROBLEM SELECTOR PLAN 1
- speech and swallow with no dysphagia and regular diet recommended  - r/o esophageal mass given pt with feeling of something "stuck in neck"  - planned for EGD today, 9/13, however, per anesthesia not stable from a pulmonary standpoint  - aspiration precautions - speech and swallow with no dysphagia and regular diet recommended  - r/o esophageal mass given pt with feeling of something "stuck in neck"  - planned for EGD today, 9/13, however, per anesthesia not stable   - reschedule for tomorrow as patient has pulmonary clearance  - aspiration precautions

## 2017-09-13 NOTE — PROGRESS NOTE ADULT - SUBJECTIVE AND OBJECTIVE BOX
INTERVAL HISTORY:  	  MEDICATIONS:  aspirin enteric coated 81 milliGRAM(s) Oral daily    trimethoprim  160 mG/sulfamethoxazole 800 mG 1 Tablet(s) Oral <User Schedule>    guaiFENesin    Syrup 100 milliGRAM(s) Oral every 6 hours PRN    acetaminophen   Tablet. 650 milliGRAM(s) Oral every 6 hours PRN    simethicone 80 milliGRAM(s) Chew every 6 hours PRN  pantoprazole    Tablet 40 milliGRAM(s) Oral before breakfast    methylPREDNISolone sodium succinate Injectable 60 milliGRAM(s) IV Push daily    witch hazel Pads 1 Application(s) Topical four times a day PRN      PHYSICAL EXAM:  T(C): 36.5 (09-13-17 @ 05:29), Max: 36.7 (09-12-17 @ 13:58)  HR: 78 (09-13-17 @ 05:29) (78 - 84)  BP: 117/74 (09-13-17 @ 05:29) (114/82 - 124/87)  RR: 16 (09-13-17 @ 05:29) (16 - 18)  SpO2: 100% (09-13-17 @ 05:29) (98% - 100%)  Wt(kg): --  I&O's Summary        Appearance: Normal	  HEENT:   Normal oral mucosa, PERRL, EOMI	  Cardiovascular: Normal S1 S2, No JVD, No murmurs, No edema  Respiratory: Lungs clear to auscultation	  Gastrointestinal:  Soft, Non-tender, + BS	  Extremities: Normal range of motion, No clubbing, cyanosis or edema                                    14.9   13.42 )-----------( 213      ( 13 Sep 2017 06:35 )             47.1     09-13    138  |  102  |  23  ----------------------------<  114<H>  3.6   |  24  |  0.78    Ca    10.0      13 Sep 2017 06:35      proBNP:   Lipid Profile:   HgA1c:   TSH: INTERVAL HISTORY: Pt is lying in bed comfortable, not in distress  	  MEDICATIONS:  aspirin enteric coated 81 milliGRAM(s) Oral daily  trimethoprim  160 mG/sulfamethoxazole 800 mG 1 Tablet(s) Oral <User Schedule>  guaiFENesin    Syrup 100 milliGRAM(s) Oral every 6 hours PRN  acetaminophen   Tablet. 650 milliGRAM(s) Oral every 6 hours PRN  simethicone 80 milliGRAM(s) Chew every 6 hours PRN  pantoprazole    Tablet 40 milliGRAM(s) Oral before breakfast  methylPREDNISolone sodium succinate Injectable 60 milliGRAM(s) IV Push daily  witch hazel Pads 1 Application(s) Topical four times a day PRN      PHYSICAL EXAM:  T(C): 36.5 (09-13-17 @ 05:29), Max: 36.7 (09-12-17 @ 13:58)  HR: 78 (09-13-17 @ 05:29) (78 - 84)  BP: 117/74 (09-13-17 @ 05:29) (114/82 - 124/87)  RR: 16 (09-13-17 @ 05:29) (16 - 18)  SpO2: 100% (09-13-17 @ 05:29) (98% - 100%)  Wt(kg): --  I&O's Summary        Appearance: Normal	  HEENT:   Normal oral mucosa, PERRL, EOMI	  Cardiovascular: Normal S1 S2, No JVD, No murmurs, No edema  Respiratory: b/l basilar crackles  Gastrointestinal:  Soft, Non-tender, + BS	  Extremities: Normal range of motion, No clubbing, cyanosis or edema                                    14.9   13.42 )-----------( 213      ( 13 Sep 2017 06:35 )             47.1     09-13    138  |  102  |  23  ----------------------------<  114<H>  3.6   |  24  |  0.78    Ca    10.0      13 Sep 2017 06:35      proBNP:   Lipid Profile:   HgA1c:   TSH:

## 2017-09-13 NOTE — PROGRESS NOTE ADULT - ATTENDING COMMENTS
pt for edg/colonscopy today  pelvic sono was neg  quant gold pending  check RA sat with ambulation  cont steroids, rheum f/u appreciated  d/c planning

## 2017-09-13 NOTE — PROGRESS NOTE ADULT - ASSESSMENT
Patient is a 52 year old female with no significant PMH who comes in for increasing SOB ad was found to have pulmonary fibrosis and ILD on Ct in addition to physical finds of questionable gottrens papules over Bl MCP joints, heliotrope rash surrounds BL eyes suspicious for dermatomyositis.       1) Hyperpigmented rash, ILD, Pulmonary fibrosis, dysphagia- rule out Dermatomyositis.  -presented with SOB, found to have ILD and pulmonary fibrosis with traction bronchiectasis.  -anti SSA elevated( seen in Pulmonary fibrosis), igG elevated 1646, anti smith and RNP negative in addition to cultures.  -radiology for dysphagia showed no aspiration  -2d echo was WNL, Normal LVF, no right heart strain or signs of pulmonary HTN.  -physical findings consistent with oral hyperpigmentation, gottrens papules BL MCP, resolving heliotrope rash of BL eyes, no proximal muscle weakness. This is seen in Amyopathic DM where there are the skin findings without muscle weakness. Antisynthetase syndrome involved myositis, raynauds, constitutional symptoms,  hands, arthritis( usually non erosive) and ILD.   -CPP 1200-->1143-->336  -pelvis US negative for any masses  -walked patient up and down the jensen, patient desaturated to lowest 88 but remained asymptomatic  Plan:  -continue with solumedrol dose for now until quantiferon gold comes back to consider pulse dose steroids.  -Patient is NPO post today for EGD/colonoscopy to evaluate dysphagia and possible underlying malignancy?  -IgG subset as IgG was elevated  -follow up MRI liver for liver mass evaluation  -continue with 60 IV for dosing of 1mg/kg. Will consider pulse steroids for tx of NSIP  -will continue to monitor and follow course.   -follow up with pulmonary, patient may benefit from home oxygen Patient is a 52 year old female with no significant PMH who comes in for increasing SOB ad was found to have pulmonary fibrosis and ILD on Ct in addition to physical finds of questionable gottrens papules over Bl MCP joints, heliotrope rash surrounds BL eyes suspicious for dermatomyositis.       1) Hyperpigmented rash, ILD, Pulmonary fibrosis, dysphagia- rule out Dermatomyositis.  -presented with SOB, found to have ILD and pulmonary fibrosis with traction bronchiectasis.  -anti SSA elevated( seen in Pulmonary fibrosis), igG elevated 1646, anti smith and RNP negative in addition to cultures.  -radiology for dysphagia showed no aspiration  -2d echo was WNL, Normal LVF, no right heart strain or signs of pulmonary HTN.  -physical findings consistent with oral hyperpigmentation, gottrens papules BL MCP, resolving heliotrope rash of BL eyes, no proximal muscle weakness. This is seen in Amyopathic DM where there are the skin findings without muscle weakness. Antisynthetase syndrome involved myositis, raynauds, constitutional symptoms,  hands, arthritis( usually non erosive) and ILD.   -CPP 1200-->1143-->336  -pelvis US negative for any masses  -walked patient up and down the jensen, patient desaturated to lowest 88% on RA but remained asymptomatic  -Quantitative IgG: elevated, IgA 306, IgM 120  -Quantiferon negative   -normal urine electrophoresis, chronic inflammation seen on serum electrophoresis   -dsDNA negative, RF 9.9  Plan:  -Patient was NPO post today for EGD/colonoscopy to evaluate dysphagia and possible underlying malignancy but was postponed by anesthesia due to SOB, plan for scope tomorrow pending Pulmonary clearance.  -follow up MRI abdomen for liver mass evaluation  -continue with 60 IV for dosing of 1mg/kg. Will consider pulse steroids for tx of NSIP  -will continue to monitor and follow course.   - patient may benefit from home oxygen due to desaturation upon walking. Patient is a 52 year old female with no significant PMH who comes in for increasing SOB ad was found to have pulmonary fibrosis and ILD on Ct in addition to physical finds of questionable gottrens papules over Bl MCP joints, heliotrope rash surrounds BL eyes suspicious for dermatomyositis.       1) Hyperpigmented rash, ILD, Pulmonary fibrosis, dysphagia- rule out Dermatomyositis.  -presented with SOB, found to have ILD and pulmonary fibrosis with traction bronchiectasis.  -anti SSA elevated( seen in Pulmonary fibrosis), igG elevated 1646, anti smith and RNP negative in addition to cultures.  -radiology for dysphagia showed no aspiration  -2d echo was WNL, Normal LVF, no right heart strain or signs of pulmonary HTN.  -physical findings consistent with oral hyperpigmentation, gottrens papules BL MCP, resolving heliotrope rash of BL eyes, no proximal muscle weakness. This is seen in Amyopathic DM where there are the skin findings without muscle weakness. Antisynthetase syndrome involved myositis, raynauds, constitutional symptoms,  hands, arthritis( usually non erosive) and ILD.   -CPK 1200-->1143-->336  -pelvis US negative for any masses  -walked patient up and down the jensen, patient desaturated to lowest 88% on RA but remained asymptomatic  -Quantitative IgG: elevated, IgA 306, IgM 120  -Quantiferon negative   -normal urine electrophoresis, chronic inflammation seen on serum electrophoresis   -dsDNA negative, RF 9.9    Plan:  -Patient was NPO post today for EGD/colonoscopy to evaluate dysphagia and possible underlying malignancy but was postponed by anesthesia due to SOB, plan for EGD/Colon tomorrow.  -follow up MRI abdomen for liver mass evaluation  -continue with 60 IV for dosing of 1mg/kg. Will consider pulse steroids for tx of NSIP  - will continue to monitor and follow course.   - patient may benefit from home oxygen due to desaturation upon walking. Patient is a 52 year old female with no significant PMH who comes in for increasing SOB ad was found to have pulmonary fibrosis and ILD on Ct in addition to physical finds of questionable gottrens papules over Bl MCP joints, heliotrope rash surrounds BL eyes suspicious for dermatomyositis.       1)  Dermatomyositis (rash, ILD, dysphagia, elevated CPK)  brandi marker panel pending  continue with current regimen of steroids, mild improvement  no evidence of pulmonary hypertension  work up for malignancy negative thus far  desaturates to lowest 88% on RA, may benefit from supplemental oxygen  -Quantiferon negative   -normal urine electrophoresis, chronic inflammation seen on serum electrophoresis   -serology negative thus far    awaiting endoscopy/MRI of the liver

## 2017-09-14 ENCOUNTER — RESULT REVIEW (OUTPATIENT)
Age: 52
End: 2017-09-14

## 2017-09-14 ENCOUNTER — TRANSCRIPTION ENCOUNTER (OUTPATIENT)
Age: 52
End: 2017-09-14

## 2017-09-14 LAB
BACTERIA BLD CULT: SIGNIFICANT CHANGE UP
BACTERIA BLD CULT: SIGNIFICANT CHANGE UP
BUN SERPL-MCNC: 32 MG/DL — HIGH (ref 7–23)
CALCIUM SERPL-MCNC: 10 MG/DL — SIGNIFICANT CHANGE UP (ref 8.4–10.5)
CHLORIDE SERPL-SCNC: 99 MMOL/L — SIGNIFICANT CHANGE UP (ref 98–107)
CO2 SERPL-SCNC: 24 MMOL/L — SIGNIFICANT CHANGE UP (ref 22–31)
CREAT SERPL-MCNC: 0.81 MG/DL — SIGNIFICANT CHANGE UP (ref 0.5–1.3)
GLUCOSE SERPL-MCNC: 128 MG/DL — HIGH (ref 70–99)
HCT VFR BLD CALC: 46.6 % — HIGH (ref 34.5–45)
HGB BLD-MCNC: 14.8 G/DL — SIGNIFICANT CHANGE UP (ref 11.5–15.5)
MCHC RBC-ENTMCNC: 26.5 PG — LOW (ref 27–34)
MCHC RBC-ENTMCNC: 31.8 % — LOW (ref 32–36)
MCV RBC AUTO: 83.4 FL — SIGNIFICANT CHANGE UP (ref 80–100)
NRBC # FLD: 0 — SIGNIFICANT CHANGE UP
PLATELET # BLD AUTO: 243 K/UL — SIGNIFICANT CHANGE UP (ref 150–400)
PMV BLD: 10.8 FL — SIGNIFICANT CHANGE UP (ref 7–13)
POTASSIUM SERPL-MCNC: 4.2 MMOL/L — SIGNIFICANT CHANGE UP (ref 3.5–5.3)
POTASSIUM SERPL-SCNC: 4.2 MMOL/L — SIGNIFICANT CHANGE UP (ref 3.5–5.3)
RBC # BLD: 5.59 M/UL — HIGH (ref 3.8–5.2)
RBC # FLD: 14.6 % — HIGH (ref 10.3–14.5)
SODIUM SERPL-SCNC: 137 MMOL/L — SIGNIFICANT CHANGE UP (ref 135–145)
WBC # BLD: 13.81 K/UL — HIGH (ref 3.8–10.5)
WBC # FLD AUTO: 13.81 K/UL — HIGH (ref 3.8–10.5)

## 2017-09-14 PROCEDURE — 99233 SBSQ HOSP IP/OBS HIGH 50: CPT | Mod: GC

## 2017-09-14 PROCEDURE — 88305 TISSUE EXAM BY PATHOLOGIST: CPT | Mod: 26

## 2017-09-14 PROCEDURE — 88312 SPECIAL STAINS GROUP 1: CPT | Mod: 26

## 2017-09-14 RX ADMIN — Medication 60 MILLIGRAM(S): at 05:40

## 2017-09-14 NOTE — PROGRESS NOTE ADULT - ASSESSMENT
53 y/o female presents to the ED with shortness of breath and chest pain.   Pt with Dermatomyositis /resp involvment Dx.  It is recommended that patient continue receiving 02 upon discharge.

## 2017-09-14 NOTE — DISCHARGE NOTE ADULT - DURABLE MEDICAL EQUIPMENT AGENCY
[facility] [phone]: A Home Oxygen Concentrator w/Home Fill Portable System will be delivered to your home. A  Portable Oxygen Tank will be delivered to your Hospital Room for Transport Home.

## 2017-09-14 NOTE — DISCHARGE NOTE ADULT - ADDITIONAL INSTRUCTIONS
You have an appointment with the pulmonary clinic on 9/27/17 at 2:20pm. [address] [place] [address] You have an appointment with the pulmonary clinic on 9/27/17 at 2:20pm. [facility]  You have an appointment with the rheumatolgy clinic on 9/25/17 at 8am.

## 2017-09-14 NOTE — DISCHARGE NOTE ADULT - CARE PLAN
Principal Discharge DX:	Dermatomyositis with respiratory involvement  Goal:	management  Instructions for follow-up, activity and diet:	Please continue with steroids and medications as directed.  Continue with supplemental oxygen as needed.  Please see pulmonary clinic for follow up - appt listed below.  Please see rheumatology clinic for follow up - appt listed below.  Secondary Diagnosis:	Rectal pain  Instructions for follow-up, activity and diet:	You had a colonoscopy and endoscopy while in the hospital - with biopsies taken.  Follow up with GI for pathology results.  You may need another colonoscopy due to poor prep.  Continue medications.

## 2017-09-14 NOTE — PROGRESS NOTE ADULT - SUBJECTIVE AND OBJECTIVE BOX
CHIEF COMPLAINT:    Interval Events:    REVIEW OF SYSTEMS:  Constitutional:   Eyes:  ENT:  CV:  Resp:  GI:  :  MSK:  Integumentary:  Neurological:  Psychiatric:  Endocrine:  Hematologic/Lymphatic:  Allergic/Immunologic:  [ ] All other systems negative  [ ] Unable to assess ROS because ________    OBJECTIVE:  ICU Vital Signs Last 24 Hrs  T(C): 36.8 (14 Sep 2017 05:41), Max: 36.8 (14 Sep 2017 05:41)  T(F): 98.2 (14 Sep 2017 05:41), Max: 98.2 (14 Sep 2017 05:41)  HR: 89 (14 Sep 2017 05:41) (89 - 90)  BP: 109/73 (14 Sep 2017 05:41) (109/73 - 123/84)  BP(mean): --  ABP: --  ABP(mean): --  RR: 18 (14 Sep 2017 05:41) (16 - 18)  SpO2: 100% (14 Sep 2017 05:41) (100% - 100%)        09-13 @ 07:01  -  09-14 @ 07:00  --------------------------------------------------------  IN: 0 mL / OUT: 500 mL / NET: -500 mL      CAPILLARY BLOOD GLUCOSE          PHYSICAL EXAM:  General:   HEENT:   Lymph Nodes:  Neck:   Respiratory:   Cardiovascular:   Abdomen:   Extremities:   Skin:   Neurological:  Psychiatry:    HOSPITAL MEDICATIONS:  MEDICATIONS  (STANDING):  aspirin enteric coated 81 milliGRAM(s) Oral daily  trimethoprim  160 mG/sulfamethoxazole 800 mG 1 Tablet(s) Oral <User Schedule>  pantoprazole    Tablet 40 milliGRAM(s) Oral before breakfast  methylPREDNISolone sodium succinate Injectable 60 milliGRAM(s) IV Push daily    MEDICATIONS  (PRN):  acetaminophen   Tablet. 650 milliGRAM(s) Oral every 6 hours PRN Mild Pain (1 - 3)  simethicone 80 milliGRAM(s) Chew every 6 hours PRN Gas  guaiFENesin    Syrup 100 milliGRAM(s) Oral every 6 hours PRN Cough  witch hazel Pads 1 Application(s) Topical four times a day PRN hemorrhoids      LABS:                        14.8   13.81 )-----------( 243      ( 14 Sep 2017 07:09 )             46.6     09-13    138  |  102  |  23  ----------------------------<  114<H>  3.6   |  24  |  0.78    Ca    10.0      13 Sep 2017 06:35                MICROBIOLOGY:     RADIOLOGY:  [ ] Reviewed and interpreted by me    PULMONARY FUNCTION TESTS:    EKG: CHIEF COMPLAINT: Patient is a 52y old  Female who presents with a chief complaint of Shortness of breath (08 Sep 2017 13:20)      Interval Events: Pt sent for colonoscopy/endoscopy and was cancelled by anesthesia.    REVIEW OF SYSTEMS:  Constitutional:   Eyes:  ENT:  CV:  Resp: + sob on exertion   GI:  :  MSK:  Integumentary:  Neurological:  Psychiatric:  Endocrine:  Hematologic/Lymphatic:  Allergic/Immunologic:  [x ] All other systems negative  [ ] Unable to assess ROS because ________    OBJECTIVE:  ICU Vital Signs Last 24 Hrs  T(C): 36.8 (14 Sep 2017 05:41), Max: 36.8 (14 Sep 2017 05:41)  T(F): 98.2 (14 Sep 2017 05:41), Max: 98.2 (14 Sep 2017 05:41)  HR: 89 (14 Sep 2017 05:41) (89 - 90)  BP: 109/73 (14 Sep 2017 05:41) (109/73 - 123/84)  BP(mean): --  ABP: --  ABP(mean): --  RR: 18 (14 Sep 2017 05:41) (16 - 18)  SpO2: 100% (14 Sep 2017 05:41) (100% - 100%)        09-13 @ 07:01  -  09-14 @ 07:00  --------------------------------------------------------  IN: 0 mL / OUT: 500 mL / NET: -500 mL      CAPILLARY BLOOD GLUCOSE    HOSPITAL MEDICATIONS:  MEDICATIONS  (STANDING):  aspirin enteric coated 81 milliGRAM(s) Oral daily  trimethoprim  160 mG/sulfamethoxazole 800 mG 1 Tablet(s) Oral <User Schedule>  pantoprazole    Tablet 40 milliGRAM(s) Oral before breakfast  methylPREDNISolone sodium succinate Injectable 60 milliGRAM(s) IV Push daily    MEDICATIONS  (PRN):  acetaminophen   Tablet. 650 milliGRAM(s) Oral every 6 hours PRN Mild Pain (1 - 3)  simethicone 80 milliGRAM(s) Chew every 6 hours PRN Gas  guaiFENesin    Syrup 100 milliGRAM(s) Oral every 6 hours PRN Cough  witch hazel Pads 1 Application(s) Topical four times a day PRN hemorrhoids      LABS:                        14.8   13.81 )-----------( 243      ( 14 Sep 2017 07:09 )             46.6     09-13    138  |  102  |  23  ----------------------------<  114<H>  3.6   |  24  |  0.78    Ca    10.0      13 Sep 2017 06:35                MICROBIOLOGY:     RADIOLOGY:  [ ] Reviewed and interpreted by me    PULMONARY FUNCTION TESTS:    EKG:

## 2017-09-14 NOTE — PROGRESS NOTE ADULT - ATTENDING COMMENTS
EGD/colon planned for this morning.  Stable for discharge home after assuming she is stable.  Desat to 87% on ra with ambultation, to be d/aidan with home oxygen.  f/u with rheumatology and pulmonary.  dermatomyositis with ILD, chronic resp failure with hypoxia, improving with steroids

## 2017-09-14 NOTE — DISCHARGE NOTE ADULT - CARE PROVIDERS DIRECT ADDRESSES
[email or web address],DirectAddress_Unknown

## 2017-09-14 NOTE — DISCHARGE NOTE ADULT - PLAN OF CARE
management Please continue with steroids and medications as directed.  Continue with supplemental oxygen as needed.  Please see pulmonary clinic for follow up - appt listed below.  Please see rheumatology clinic for follow up - appt listed below. You had a colonoscopy and endoscopy while in the hospital - with biopsies taken.  Follow up with GI for pathology results.  You may need another colonoscopy due to poor prep.  Continue medications.

## 2017-09-14 NOTE — DISCHARGE NOTE ADULT - HOSPITAL COURSE
51 yo F p/w PYLE x 2 weeks, cough/ CP, on Tele for elevated CE's.  ACS ruled out.  Pt with dyspnea, found with dermatomyositis with pulmonary involvement. Rheum consulted.  Pt sent home with prednisone and pcp ppx.  Outpatient follow up.  Home oxygen set up.    dispo: to home

## 2017-09-14 NOTE — PROGRESS NOTE ADULT - PROBLEM SELECTOR PLAN 5
-Rheumatology work up in progress to determine if patient has dermatomyositis vs. antisynthetase  - f/u MRI abd  - pelvis US  -Endoscopy/colonoscopy cancelled sec to concerns by anesthesia with sob/hypoxia -Rheumatology work up in progress to determine if patient has dermatomyositis vs. antisynthetase  - f/u MRI  - pelvis US negative   -Endoscopy/colonoscopy done today awaiting report

## 2017-09-14 NOTE — PROGRESS NOTE ADULT - PROBLEM SELECTOR PLAN 4
-Cinesophagram completed. May continue with regular diet  -Speech and swallow will f/u with exercises  -Endoscopy cancelled sec. to concerns with desat pt is cleared by pulmonary for procedures and pt to have testing today.  Remains npo

## 2017-09-14 NOTE — PROGRESS NOTE ADULT - ASSESSMENT
1) Pulmonary fibrosis - possible NSIP, f/u rheumatology work up, f/u pulmonary r/o dermatomyosistis vs anti synthetase syndrome    2) Dysphagia - pt seen by GI, for EGD yesterday as it was cancelled, for EGD today    3) Elevated troponins - unlikely ACS D/C HEPARIN, 2d echo shows normal LV function.

## 2017-09-14 NOTE — PROGRESS NOTE ADULT - PROBLEM SELECTOR PLAN 2
Pulmonary Fibrosis on CT Chest  occ prod cough   - c/w Solu-Medrol  - c/w Bactrim ppx for PCP  - monitor respiratory status closely

## 2017-09-14 NOTE — DISCHARGE NOTE ADULT - CARE PROVIDER_API CALL
Lisker, Gita N (MD), Medicine  Pulmonary Medicine  [address] [facility] [address]  [place] [address]  Phone: [phone]  Fax: [phone]    Carole Ragland (), Internal Medicine; Rheumatology  [address] [place] [address]  Phone: [phone]  Fax: [phone]    Vandana Nowak), Internal Medicine  [address] [place] [address]  Phone: [phone]  Fax: [phone]

## 2017-09-14 NOTE — DISCHARGE NOTE ADULT - MEDICATION SUMMARY - MEDICATIONS TO TAKE
I will START or STAY ON the medications listed below when I get home from the hospital:    predniSONE 20 mg oral tablet  -- 3 tab(s) by mouth once a day   -- It is very important that you take or use this exactly as directed.  Do not skip doses or discontinue unless directed by your doctor.  Obtain medical advice before taking any non-prescription drugs as some may affect the action of this medication.  Take with food or milk.    -- Indication: For Dermatomyositis with respiratory involvement    acetaminophen 325 mg oral tablet  -- 2 tab(s) by mouth every 6 hours, As needed, Mild Pain (1 - 3)  -- Indication: For Pain    aspirin 81 mg oral delayed release tablet  -- 1 tab(s) by mouth once a day  -- Indication: For Cardiac enzymes elevated    witch hazel 50% rectal pad  -- 1 unit(s) rectally 4 times a day   -- Indication: For Hemorrhoids    guaiFENesin 100 mg/5 mL oral liquid  -- 5 milliliter(s) by mouth every 6 hours, As needed, Cough  -- Indication: For Cough    sulfamethoxazole-trimethoprim 800 mg-160 mg oral tablet  -- 1 tab(s) by mouth every other day (every monday, wednesday, friday)  -- Indication: For Dermatomyositis with respiratory involvement    simethicone 80 mg oral tablet, chewable  -- 1 tab(s) by mouth every 6 hours, As needed, Gas  -- Indication: For Gas/ Indigestion    omeprazole 40 mg oral delayed release capsule  -- 1 cap(s) by mouth once a day  -- Indication: For While on steroids I will START or STAY ON the medications listed below when I get home from the hospital:    predniSONE 10 mg oral tablet  -- 7 tab(s) by mouth once a day  -- Indication: For Dermatomyositis with respiratory involvement    acetaminophen 325 mg oral tablet  -- 2 tab(s) by mouth every 6 hours, As needed, Mild Pain (1 - 3)  -- Indication: For Pain    aspirin 81 mg oral delayed release tablet  -- 1 tab(s) by mouth once a day  -- Indication: For Cardiac enzymes elevated    witch hazel 50% rectal pad  -- 1 unit(s) rectally 4 times a day   -- Indication: For Hemorrhoids    guaiFENesin 100 mg/5 mL oral liquid  -- 5 milliliter(s) by mouth every 6 hours, As needed, Cough  -- Indication: For Cough    sulfamethoxazole-trimethoprim 800 mg-160 mg oral tablet  -- 1 tab(s) by mouth every other day (every monday, wednesday, friday)  -- Indication: For Dermatomyositis with respiratory involvement    simethicone 80 mg oral tablet, chewable  -- 1 tab(s) by mouth every 6 hours, As needed, Gas  -- Indication: For Gas/ Indigestion    omeprazole 40 mg oral delayed release capsule  -- 1 cap(s) by mouth once a day  -- Indication: For While on steroids I will START or STAY ON the medications listed below when I get home from the hospital:    predniSONE 10 mg oral tablet  -- 7 tab(s) by mouth once a day   -- It is very important that you take or use this exactly as directed.  Do not skip doses or discontinue unless directed by your doctor.  Obtain medical advice before taking any non-prescription drugs as some may affect the action of this medication.  Take with food or milk.    -- Indication: For Dermatomyositis with respiratory involvement    acetaminophen 325 mg oral tablet  -- 2 tab(s) by mouth every 6 hours, As needed, Mild Pain (1 - 3)  -- Indication: For Pain    aspirin 81 mg oral delayed release tablet  -- 1 tab(s) by mouth once a day  -- Indication: For Cardiac enzymes elevated    witch hazel 50% rectal pad  -- 1 unit(s) rectally 4 times a day   -- Indication: For Hemorrhoids    guaiFENesin 100 mg/5 mL oral liquid  -- 5 milliliter(s) by mouth every 6 hours, As needed, Cough  -- Indication: For Cough    sulfamethoxazole-trimethoprim 800 mg-160 mg oral tablet  -- 1 tab(s) by mouth every other day (every monday, wednesday, friday)  -- Indication: For Dermatomyositis with respiratory involvement    simethicone 80 mg oral tablet, chewable  -- 1 tab(s) by mouth every 6 hours, As needed, Gas  -- Indication: For Gas/ Indigestion    omeprazole 40 mg oral delayed release capsule  -- 1 cap(s) by mouth once a day  -- Indication: For While on steroids

## 2017-09-14 NOTE — DISCHARGE NOTE ADULT - PATIENT PORTAL LINK FT
“You can access the Follow[employer] Patient Portal, offered by [facility], by registering with the following website: [email or web address]”

## 2017-09-14 NOTE — DISCHARGE NOTE ADULT - REASON FOR ADMISSION
Health Maintenance Summary     Topic Due On Due Status Completed On    Immunization - Td/Tdap Aug 3, 2022 Not Due Aug 3, 2012    Immunization-Zoster Nov 1, 1983 Overdue     Immunization - Pneumococcal  Completed Aug 23, 2016    Medicare Wellness Visit Aug 23, 2017 Not Due Aug 23, 2016    Immunization-Influenza  Completed Oct 4, 2016          Patient is due for topics as listed above, she wishes to decline at this time .       Shortness of breath

## 2017-09-14 NOTE — PROGRESS NOTE ADULT - SUBJECTIVE AND OBJECTIVE BOX
INTERVAL HISTORY: pt seen in am comfortable, not in distress, no cp  SOB improved  	  MEDICATIONS:  aspirin enteric coated 81 milliGRAM(s) Oral daily    trimethoprim  160 mG/sulfamethoxazole 800 mG 1 Tablet(s) Oral <User Schedule>    guaiFENesin    Syrup 100 milliGRAM(s) Oral every 6 hours PRN    acetaminophen   Tablet. 650 milliGRAM(s) Oral every 6 hours PRN    simethicone 80 milliGRAM(s) Chew every 6 hours PRN  pantoprazole    Tablet 40 milliGRAM(s) Oral before breakfast    methylPREDNISolone sodium succinate Injectable 60 milliGRAM(s) IV Push daily    witch hazel Pads 1 Application(s) Topical four times a day PRN      PHYSICAL EXAM:  T(C): 36.9 (09-14-17 @ 13:32), Max: 36.9 (09-14-17 @ 13:32)  HR: 78 (09-14-17 @ 13:32) (78 - 90)  BP: 111/80 (09-14-17 @ 13:32) (109/73 - 120/85)  RR: 17 (09-14-17 @ 13:32) (17 - 18)  SpO2: 97% (09-14-17 @ 13:32) (97% - 100%)  Wt(kg): --  I&O's Summary    13 Sep 2017 07:01  -  14 Sep 2017 07:00  --------------------------------------------------------  IN: 0 mL / OUT: 500 mL / NET: -500 mL      Height (cm): 157.48 (09-14 @ 08:00)  Weight (kg): 60.8 (09-14 @ 08:00)  BMI (kg/m2): 24.5 (09-14 @ 08:00)  BSA (m2): 1.61 (09-14 @ 08:00)    Appearance: Normal	  HEENT:   Normal oral mucosa, PERRL, EOMI	  Cardiovascular: Normal S1 S2, No JVD, No murmurs, No edema  Respiratory: bibasilar crackles  Gastrointestinal:  Soft, Non-tender, + BS	  Extremities: Normal range of motion, No clubbing, cyanosis or edema                                    14.8   13.81 )-----------( 243      ( 14 Sep 2017 07:09 )             46.6     09-14    137  |  99  |  32<H>  ----------------------------<  128<H>  4.2   |  24  |  0.81    Ca    10.0      14 Sep 2017 07:09      proBNP:   Lipid Profile:   HgA1c:   TSH:

## 2017-09-15 VITALS
OXYGEN SATURATION: 98 % | TEMPERATURE: 98 F | HEART RATE: 81 BPM | RESPIRATION RATE: 18 BRPM | SYSTOLIC BLOOD PRESSURE: 112 MMHG | DIASTOLIC BLOOD PRESSURE: 71 MMHG

## 2017-09-15 PROBLEM — Z00.00 ENCOUNTER FOR PREVENTIVE HEALTH EXAMINATION: Status: ACTIVE | Noted: 2017-09-15

## 2017-09-15 LAB
HCT VFR BLD CALC: 45.9 % — HIGH (ref 34.5–45)
HGB BLD-MCNC: 14.6 G/DL — SIGNIFICANT CHANGE UP (ref 11.5–15.5)
MCHC RBC-ENTMCNC: 26.3 PG — LOW (ref 27–34)
MCHC RBC-ENTMCNC: 31.8 % — LOW (ref 32–36)
MCV RBC AUTO: 82.6 FL — SIGNIFICANT CHANGE UP (ref 80–100)
NRBC # FLD: 0 — SIGNIFICANT CHANGE UP
PLATELET # BLD AUTO: 257 K/UL — SIGNIFICANT CHANGE UP (ref 150–400)
PMV BLD: 10.6 FL — SIGNIFICANT CHANGE UP (ref 7–13)
RBC # BLD: 5.56 M/UL — HIGH (ref 3.8–5.2)
RBC # FLD: 14.5 % — SIGNIFICANT CHANGE UP (ref 10.3–14.5)
WBC # BLD: 14.54 K/UL — HIGH (ref 3.8–10.5)
WBC # FLD AUTO: 14.54 K/UL — HIGH (ref 3.8–10.5)

## 2017-09-15 PROCEDURE — 99238 HOSP IP/OBS DSCHRG MGMT 30/<: CPT

## 2017-09-15 PROCEDURE — 99231 SBSQ HOSP IP/OBS SF/LOW 25: CPT | Mod: GC

## 2017-09-15 RX ORDER — SIMETHICONE 80 MG/1
1 TABLET, CHEWABLE ORAL
Qty: 0 | Refills: 0 | COMMUNITY
Start: 2017-09-15

## 2017-09-15 RX ORDER — INFLUENZA VIRUS VACCINE 15; 15; 15; 15 UG/.5ML; UG/.5ML; UG/.5ML; UG/.5ML
0.5 SUSPENSION INTRAMUSCULAR ONCE
Qty: 0 | Refills: 0 | Status: COMPLETED | OUTPATIENT
Start: 2017-09-15 | End: 2017-09-15

## 2017-09-15 RX ORDER — OMEPRAZOLE 10 MG/1
1 CAPSULE, DELAYED RELEASE ORAL
Qty: 30 | Refills: 0 | OUTPATIENT
Start: 2017-09-15 | End: 2017-10-15

## 2017-09-15 RX ORDER — ACETAMINOPHEN 500 MG
2 TABLET ORAL
Qty: 0 | Refills: 0 | COMMUNITY
Start: 2017-09-15

## 2017-09-15 RX ORDER — PANTOPRAZOLE SODIUM 20 MG/1
1 TABLET, DELAYED RELEASE ORAL
Qty: 30 | Refills: 0 | OUTPATIENT
Start: 2017-09-15 | End: 2017-10-15

## 2017-09-15 RX ORDER — AER TRAVELER 0.5 G/1
1 SOLUTION RECTAL; TOPICAL
Qty: 1 | Refills: 0 | OUTPATIENT
Start: 2017-09-15 | End: 2017-10-15

## 2017-09-15 RX ORDER — ACETAMINOPHEN, DEXTROMETHORPHAN HYDROBROMIDE, AND DOXYLAMINE SUCCINATE 650; 30; 12.5 MG/30ML; MG/30ML; MG/30ML
0 LIQUID ORAL
Qty: 0 | Refills: 0 | COMMUNITY

## 2017-09-15 RX ORDER — ASPIRIN/CALCIUM CARB/MAGNESIUM 324 MG
1 TABLET ORAL
Qty: 30 | Refills: 0 | OUTPATIENT
Start: 2017-09-15 | End: 2017-10-15

## 2017-09-15 RX ADMIN — INFLUENZA VIRUS VACCINE 0.5 MILLILITER(S): 15; 15; 15; 15 SUSPENSION INTRAMUSCULAR at 17:23

## 2017-09-15 RX ADMIN — Medication 60 MILLIGRAM(S): at 05:15

## 2017-09-15 RX ADMIN — Medication 81 MILLIGRAM(S): at 11:17

## 2017-09-15 RX ADMIN — Medication 1 TABLET(S): at 08:08

## 2017-09-15 RX ADMIN — PANTOPRAZOLE SODIUM 40 MILLIGRAM(S): 20 TABLET, DELAYED RELEASE ORAL at 05:15

## 2017-09-15 NOTE — PROGRESS NOTE ADULT - ASSESSMENT
Patient is a 52 year old female with no significant PMH who comes in for increasing SOB and was found to have pulmonary fibrosis and ILD on Ct in addition to physical finds of gottrens papules over Bl MCP joints, heliotrope rash surrounding BL eyes and elevated CPK significant of Dermatomyositis.       1)  Dermatomyositis (rash, ILD, dysphagia, elevated CPK)  -continue with current regimen of 60mg of steroids as patient feels much better today in terms of SOB while ambulating.  -no evidence of pulmonary hypertension  -work up for malignancy negative thus far  -desaturates to lowest 88% on RA, will benefit from supplemental oxygen  -Quantiferon negative ,normal urine electrophoresis, chronic inflammation seen on serum electrophoresis , downtrending CPK  -MRI abdomen negative for lesions  -recommend outpatient mammogram as patient has never had one before.  - myositis bio markers are pending, patient will follow up as an outpatient in few weeks. Patient is a 52 year old female with no significant PMH who comes in for increasing SOB and was found to have pulmonary fibrosis and ILD on Ct in addition to physical finds of gottrens papules over Bl MCP joints, heliotrope rash surrounding BL eyes and elevated CPK significant of Dermatomyositis.       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  -continue with current regimen of 60mg of steroids as patient feels much better today in terms of SOB while ambulating.  -no evidence of pulmonary hypertension  -work up for malignancy negative thus far  -desaturates to lowest 88% on RA, will benefit from supplemental oxygen  -Quantiferon negative ,normal urine electrophoresis, chronic inflammation seen on serum electrophoresis , downtrending CPK  -MRI abdomen negative for lesions  -recommend outpatient mammogram as patient has never had one before.  - myositis bio markers are pending, patient will follow up as an outpatient in few weeks. Patient is a 52 year old female with no significant PMH who comes in for increasing SOB and was found to have pulmonary fibrosis and ILD on Ct in addition to physical finds of gottrens papules over Bl MCP joints, heliotrope rash surrounding BL eyes and elevated CPK significant of Dermatomyositis.       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 No evidence of pulmonary hypertension and malignancy workup thus far negative.   Quantiferon negative ,normal urine electrophoresis, chronic inflammation seen on serum electrophoresis , downtrending CPK  MRI abdomen negative for lesions  - pt currently improving on Solumedrol 70mg IV- can discharge with Prednisone 70mg qd  - discharge with home oxygen as she desaturates on RA  - recommend outpatient mammogram and Pap smear as patient has never had one before.  - myositis bio markers are pending, patient will follow up as an outpatient Patient is a 52 year old female with no significant PMH who comes in for increasing SOB and was found to have pulmonary fibrosis and ILD on Ct in addition to physical finds of gottrens papules over Bl MCP joints, heliotrope rash surrounding BL eyes and elevated CPK significant of Dermatomyositis.       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 No evidence of pulmonary hypertension and malignancy workup thus far negative.   Quantiferon negative ,normal urine electrophoresis, chronic inflammation seen on serum electrophoresis , downtrending CPK  MRI abdomen negative for lesions  - pt currently improving on Solumedrol 70mg IV- can discharge with Prednisone 70mg qd  - discharge with home oxygen as she desaturates on RA  - recommend outpatient mammogram and Pap smear as patient has never had one before.  - myositis bio markers are pending, patient will follow up as an outpatient at [facility]

## 2017-09-15 NOTE — PROGRESS NOTE ADULT - ATTENDING COMMENTS
Pt stable for discharge  home oxygen prn  steroid dosing per rheum.  pcp prophylaxis  quant gold negative.  outpt f/u with pulmonary, rheum, GI

## 2017-09-15 NOTE — PROGRESS NOTE ADULT - NEUROLOGICAL DETAILS
alert and oriented x 3
alert and oriented x 3
responds to verbal commands/alert and oriented x 3/responds to pain

## 2017-09-15 NOTE — PROGRESS NOTE ADULT - ASSESSMENT
53 y/o female presents to the ED with shortness of breath and chest pain.   Pt with Dermatomyositis /resp involvment Dx.  It is recommended that patient continue receiving 02 upon discharge. 51 y/o female presents to the ED with shortness of breath and chest pain.   Pt with Dermatomyositis /resp involvement

## 2017-09-15 NOTE — PROGRESS NOTE ADULT - PROBLEM SELECTOR PLAN 2
Pulmonary Fibrosis on CT Chest  occ prod cough   - c/w Solu-Medrol  - c/w Bactrim ppx for PCP  - monitor respiratory status closely - ACS ruled out  - cards note appreciated

## 2017-09-15 NOTE — PROGRESS NOTE ADULT - NEUROLOGICAL
detailed exam
Alert & oriented; no sensory, motor or coordination deficits, normal reflexes
detailed exam
detailed exam

## 2017-09-15 NOTE — PROGRESS NOTE ADULT - PROVIDER SPECIALTY LIST ADULT
Cardiology
Gastroenterology
Gastroenterology
Pulmonology
Rheumatology

## 2017-09-15 NOTE — PROGRESS NOTE ADULT - PROBLEM SELECTOR PLAN 1
-ACS ruled out.   f/u by cardiologist Dr. Alvarado - rheum note appreciated  - would continue steroids  - bactrim for pcp ppx

## 2017-09-15 NOTE — PROGRESS NOTE ADULT - CONSTITUTIONAL
Well-developed, well nourished
detailed exam
Well-developed, well nourished
detailed exam
detailed exam

## 2017-09-15 NOTE — PROGRESS NOTE ADULT - PROBLEM SELECTOR PLAN 5
-Rheumatology work up in progress to determine if patient has dermatomyositis vs. antisynthetase  - f/u MRI  - pelvis US negative   -Endoscopy/colonoscopy done today awaiting report

## 2017-09-15 NOTE — PROGRESS NOTE ADULT - SUBJECTIVE AND OBJECTIVE BOX
INTERVAL HPI/OVERNIGHT EVENTS:    s/p endoscopic work up  no complaints today    MEDICATIONS  (STANDING):  aspirin enteric coated 81 milliGRAM(s) Oral daily  trimethoprim  160 mG/sulfamethoxazole 800 mG 1 Tablet(s) Oral <User Schedule>  pantoprazole    Tablet 40 milliGRAM(s) Oral before breakfast  methylPREDNISolone sodium succinate Injectable 60 milliGRAM(s) IV Push daily    MEDICATIONS  (PRN):  acetaminophen   Tablet. 650 milliGRAM(s) Oral every 6 hours PRN Mild Pain (1 - 3)  simethicone 80 milliGRAM(s) Chew every 6 hours PRN Gas  guaiFENesin    Syrup 100 milliGRAM(s) Oral every 6 hours PRN Cough  witch hazel Pads 1 Application(s) Topical four times a day PRN hemorrhoids      Allergies    No Known Allergies    Intolerances        Review of Systems:    General:  No wt loss, fevers, chills, night sweats, fatigue   Eyes:  Good vision, no reported pain  ENT:  No sore throat, pain, runny nose, dysphagia  CV:  No pain, palpitations, hypo/hypertension  Resp:  No dyspnea, cough, tachypnea, wheezing  GI:  No pain, No nausea, No vomiting, No diarrhea, No constipation, No weight loss, No fever, No pruritis, No rectal bleeding, No melena, No dysphagia  :  No pain, bleeding, incontinence, nocturia  Muscle:  No pain, weakness  Neuro:  No weakness, tingling, memory problems  Psych:  No fatigue, insomnia, mood problems, depression  Endocrine:  No polyuria, polydypsia, cold/heat intolerance  Heme:  No petechiae, ecchymosis, easy bruisability  Skin:  No rash, tattoos, scars, edema      Vital Signs Last 24 Hrs  T(C): 36.6 (13 Sep 2017 13:32), Max: 36.6 (13 Sep 2017 13:32)  T(F): 97.8 (13 Sep 2017 13:32), Max: 97.8 (13 Sep 2017 13:32)  HR: 90 (13 Sep 2017 13:32) (78 - 90)  BP: 123/84 (13 Sep 2017 13:32) (114/82 - 123/84)  BP(mean): --  RR: 16 (13 Sep 2017 13:32) (16 - 18)  SpO2: 100% (13 Sep 2017 13:32) (100% - 100%)    PHYSICAL EXAM:    Constitutional: NAD  HEENT: EOMI, throat clear  Neck: No LAD, supple  Respiratory: CTA and P  Cardiovascular: S1 and S2, RRR, no M  Gastrointestinal: BS+, soft, NT/ND, neg HSM,  Extremities: No peripheral edema, neg clubbing, cyanosis  Vascular: 2+ peripheral pulses  Neurological: A/O x 3, no focal deficits  Psychiatric: Normal mood, normal affect  Skin: No rashes      LABS:                        14.9   13.42 )-----------( 213      ( 13 Sep 2017 06:35 )             47.1     09-13    138  |  102  |  23  ----------------------------<  114<H>  3.6   |  24  |  0.78    Ca    10.0      13 Sep 2017 06:35            RADIOLOGY & ADDITIONAL TESTS:

## 2017-09-15 NOTE — PROGRESS NOTE ADULT - PROBLEM SELECTOR PROBLEM 5
Dermatomyositis with respiratory involvement

## 2017-09-15 NOTE — PROGRESS NOTE ADULT - SUBJECTIVE AND OBJECTIVE BOX
CHIEF COMPLAINT:    Interval Events:    REVIEW OF SYSTEMS:  Constitutional:   Eyes:  ENT:  CV:  Resp:  GI:  :  MSK:  Integumentary:  Neurological:  Psychiatric:  Endocrine:  Hematologic/Lymphatic:  Allergic/Immunologic:  [ ] All other systems negative  [ ] Unable to assess ROS because ________    OBJECTIVE:  ICU Vital Signs Last 24 Hrs  T(C): 36.6 (15 Sep 2017 05:10), Max: 36.9 (14 Sep 2017 13:32)  T(F): 97.8 (15 Sep 2017 05:10), Max: 98.5 (14 Sep 2017 13:32)  HR: 80 (15 Sep 2017 05:10) (75 - 89)  BP: 109/65 (15 Sep 2017 05:10) (104/66 - 111/80)  BP(mean): --  ABP: --  ABP(mean): --  RR: 18 (15 Sep 2017 05:10) (17 - 18)  SpO2: 98% (15 Sep 2017 05:10) (97% - 99%)      HOSPITAL MEDICATIONS:  MEDICATIONS  (STANDING):  aspirin enteric coated 81 milliGRAM(s) Oral daily  trimethoprim  160 mG/sulfamethoxazole 800 mG 1 Tablet(s) Oral <User Schedule>  pantoprazole    Tablet 40 milliGRAM(s) Oral before breakfast  methylPREDNISolone sodium succinate Injectable 60 milliGRAM(s) IV Push daily    MEDICATIONS  (PRN):  acetaminophen   Tablet. 650 milliGRAM(s) Oral every 6 hours PRN Mild Pain (1 - 3)  simethicone 80 milliGRAM(s) Chew every 6 hours PRN Gas  guaiFENesin    Syrup 100 milliGRAM(s) Oral every 6 hours PRN Cough  witch hazel Pads 1 Application(s) Topical four times a day PRN hemorrhoids      LABS:                        14.6   14.54 )-----------( 257      ( 15 Sep 2017 07:30 )             45.9     09-14    137  |  99  |  32<H>  ----------------------------<  128<H>  4.2   |  24  |  0.81    Ca    10.0      14 Sep 2017 07:09                MICROBIOLOGY:     RADIOLOGY:  [ ] Reviewed and interpreted by me    PULMONARY FUNCTION TESTS:    EKG: CHIEF COMPLAINT: Patient is a 52y old  Female who presents with a chief complaint of Shortness of breath (14 Sep 2017 15:03)    Interval Events: none overnight      REVIEW OF SYSTEMS:  CV: denies  Resp: denies  GI: denies  [x] All other systems negative  [ ] Unable to assess ROS because ________      OBJECTIVE:  ICU Vital Signs Last 24 Hrs  T(C): 36.6 (15 Sep 2017 05:10), Max: 36.9 (14 Sep 2017 13:32)  T(F): 97.8 (15 Sep 2017 05:10), Max: 98.5 (14 Sep 2017 13:32)  HR: 80 (15 Sep 2017 05:10) (75 - 89)  BP: 109/65 (15 Sep 2017 05:10) (104/66 - 111/80)  BP(mean): --  ABP: --  ABP(mean): --  RR: 18 (15 Sep 2017 05:10) (17 - 18)  SpO2: 98% (15 Sep 2017 05:10) (97% - 99%)      HOSPITAL MEDICATIONS:  MEDICATIONS  (STANDING):  aspirin enteric coated 81 milliGRAM(s) Oral daily  trimethoprim  160 mG/sulfamethoxazole 800 mG 1 Tablet(s) Oral <User Schedule>  pantoprazole    Tablet 40 milliGRAM(s) Oral before breakfast  methylPREDNISolone sodium succinate Injectable 60 milliGRAM(s) IV Push daily    MEDICATIONS  (PRN):  acetaminophen   Tablet. 650 milliGRAM(s) Oral every 6 hours PRN Mild Pain (1 - 3)  simethicone 80 milliGRAM(s) Chew every 6 hours PRN Gas  guaiFENesin    Syrup 100 milliGRAM(s) Oral every 6 hours PRN Cough  witch hazel Pads 1 Application(s) Topical four times a day PRN hemorrhoids      LABS:                        14.6   14.54 )-----------( 257      ( 15 Sep 2017 07:30 )             45.9

## 2017-09-15 NOTE — PROGRESS NOTE ADULT - ASSESSMENT
1) Pulmonary fibrosis - possible NSIP, f/u rheumatology work up, f/u pulmonary r/o dermatomyosistis vs anti synthetase syndrome, desaturates on RA will benefit from supplemental oxygen    2) Dysphagia - EGD colonoscopy negative for malignancy    3) Elevated troponins - unlikely ACS D/C HEPARIN, 2d echo shows normal LV function.

## 2017-09-15 NOTE — PROGRESS NOTE ADULT - CARDIOVASCULAR
detailed exam
detailed exam
Regular rate & rhythm, normal S1, S2; no murmurs, gallops or rubs; no S3, S4
detailed exam

## 2017-09-15 NOTE — PROGRESS NOTE ADULT - RS GEN PE MLT RESP DETAILS PC
breath sounds equal/good air movement/airway patent
clear to auscultation bilaterally/good air movement/no intercostal retractions/airway patent/breath sounds equal
breath sounds equal/good air movement/airway patent
rales/airway patent
rales/breath sounds equal/airway patent

## 2017-09-15 NOTE — PROGRESS NOTE ADULT - PROBLEM SELECTOR PLAN 4
-Cinesophagram completed. May continue with regular diet  -Speech and swallow will f/u with exercises  -Endoscopy cancelled sec. to concerns with desat pt is cleared by pulmonary for procedures and pt to have testing today.  Remains npo - heparin SQ

## 2017-09-15 NOTE — PROGRESS NOTE ADULT - SUBJECTIVE AND OBJECTIVE BOX
INTERVAL HISTORY: pt is lying in bed comfortable, SOB improving, EGD colonoscopy done yesterday  	  MEDICATIONS:  aspirin enteric coated 81 milliGRAM(s) Oral daily    trimethoprim  160 mG/sulfamethoxazole 800 mG 1 Tablet(s) Oral <User Schedule>    guaiFENesin    Syrup 100 milliGRAM(s) Oral every 6 hours PRN    acetaminophen   Tablet. 650 milliGRAM(s) Oral every 6 hours PRN    simethicone 80 milliGRAM(s) Chew every 6 hours PRN  pantoprazole    Tablet 40 milliGRAM(s) Oral before breakfast    methylPREDNISolone sodium succinate Injectable 60 milliGRAM(s) IV Push daily    witch hazel Pads 1 Application(s) Topical four times a day PRN  influenza   Vaccine 0.5 milliLiter(s) IntraMuscular once      PHYSICAL EXAM:  T(C): 36.6 (09-15-17 @ 05:10), Max: 36.9 (09-14-17 @ 13:32)  HR: 80 (09-15-17 @ 05:10) (75 - 89)  BP: 109/65 (09-15-17 @ 05:10) (104/66 - 111/80)  RR: 18 (09-15-17 @ 05:10) (17 - 18)  SpO2: 98% (09-15-17 @ 05:10) (97% - 99%)  Wt(kg): --  I&O's Summary        Appearance: Normal	  HEENT:   heliotropic rash  Cardiovascular: Normal S1 S2, No JVD, No murmurs, No edema  Respiratory: bibasilar crackles  Gastrointestinal:  Soft, Non-tender, + BS	  Extremities: no edema                                    14.6   14.54 )-----------( 257      ( 15 Sep 2017 07:30 )             45.9     09-14    137  |  99  |  32<H>  ----------------------------<  128<H>  4.2   |  24  |  0.81    Ca    10.0      14 Sep 2017 07:09      proBNP:   Lipid Profile:   HgA1c:   TSH:

## 2017-09-15 NOTE — PROGRESS NOTE ADULT - EXTREMITIES
detailed exam
detailed exam
No cyanosis, clubbing or edema
detailed exam

## 2017-09-15 NOTE — PROGRESS NOTE ADULT - [facility] HE PE MLT D E PC
regular rate and rhythm
no murmur/regular rate and rhythm/no rub

## 2017-09-15 NOTE — PROGRESS NOTE ADULT - SUBJECTIVE AND OBJECTIVE BOX
ALIS SARAVIA  [id number]    INTERVAL HPI/OVERNIGHT EVENTS:    MEDICATIONS  (STANDING):  aspirin enteric coated 81 milliGRAM(s) Oral daily  trimethoprim  160 mG/sulfamethoxazole 800 mG 1 Tablet(s) Oral <User Schedule>  pantoprazole    Tablet 40 milliGRAM(s) Oral before breakfast  methylPREDNISolone sodium succinate Injectable 60 milliGRAM(s) IV Push daily  influenza   Vaccine 0.5 milliLiter(s) IntraMuscular once    MEDICATIONS  (PRN):  acetaminophen   Tablet. 650 milliGRAM(s) Oral every 6 hours PRN Mild Pain (1 - 3)  simethicone 80 milliGRAM(s) Chew every 6 hours PRN Gas  guaiFENesin    Syrup 100 milliGRAM(s) Oral every 6 hours PRN Cough  witch hazel Pads 1 Application(s) Topical four times a day PRN hemorrhoids      Allergies    No Known Allergies    Intolerances        Review of Systems:   General: No fevers/chills, no fatigue  HEENT: No blurry vision, dysphagia, or odynophagia  CVS: No CP/palpitations  Resp: No SOB/wheezing  GI: No N/V/C/D/abdominal pain  MSK:   Skin: No new rashes  Neuro: No headaches      Vital Signs Last 24 Hrs  T(C): 36.7 (15 Sep 2017 13:45), Max: 36.9 (14 Sep 2017 18:07)  T(F): 98 (15 Sep 2017 13:45), Max: 98.5 (14 Sep 2017 18:07)  HR: 81 (15 Sep 2017 13:45) (75 - 89)  BP: 112/71 (15 Sep 2017 13:45) (104/66 - 112/71)  BP(mean): --  RR: 18 (15 Sep 2017 13:45) (18 - 18)  SpO2: 98% (15 Sep 2017 13:45) (98% - 99%)    Physical Exam:  General: NAD  HEENT: EOMI, MMM  Cardio: +S1/S2, RRR  Resp: CTA b/l  GI: +BS, soft, NT/ND  MSK:  Neuro: AAOx3  Psych: wnl    LABS:                        14.6   14.54 )-----------( 257      ( 15 Sep 2017 07:30 )             45.9     09-14    137  |  99  |  32<H>  ----------------------------<  128<H>  4.2   |  24  |  0.81    Ca    10.0      14 Sep 2017 07:09              RADIOLOGY & ADDITIONAL TESTS: ALIS SARAVIA  [id number]    INTERVAL HPI/OVERNIGHT EVENTS:  post EGD, colonoscopy, no acute events  MEDICATIONS  (STANDING):  aspirin enteric coated 81 milliGRAM(s) Oral daily  trimethoprim  160 mG/sulfamethoxazole 800 mG 1 Tablet(s) Oral <User Schedule>  pantoprazole    Tablet 40 milliGRAM(s) Oral before breakfast  methylPREDNISolone sodium succinate Injectable 60 milliGRAM(s) IV Push daily  influenza   Vaccine 0.5 milliLiter(s) IntraMuscular once    MEDICATIONS  (PRN):  acetaminophen   Tablet. 650 milliGRAM(s) Oral every 6 hours PRN Mild Pain (1 - 3)  simethicone 80 milliGRAM(s) Chew every 6 hours PRN Gas  guaiFENesin    Syrup 100 milliGRAM(s) Oral every 6 hours PRN Cough  witch hazel Pads 1 Application(s) Topical four times a day PRN hemorrhoids      Allergies    No Known Allergies    Intolerances        Review of Systems:   General: No fevers/chills, no fatigue  HEENT: No blurry vision, dysphagia, or odynophagia  CVS: No CP/palpitations  Resp: SOB improving upon ambulation  GI: No N/V/C/D/abdominal pain  MSK:   Skin: No new rashes  Neuro: No headaches      Vital Signs Last 24 Hrs  T(C): 36.7 (15 Sep 2017 13:45), Max: 36.9 (14 Sep 2017 18:07)  T(F): 98 (15 Sep 2017 13:45), Max: 98.5 (14 Sep 2017 18:07)  HR: 81 (15 Sep 2017 13:45) (75 - 89)  BP: 112/71 (15 Sep 2017 13:45) (104/66 - 112/71)  BP(mean): --  RR: 18 (15 Sep 2017 13:45) (18 - 18)  SpO2: 98% (15 Sep 2017 13:45) (98% - 99%)    Physical Exam:  General: NAD, laying down comfortably, in no distress, AAOX3  HEENT: EOMI, MMM, hyperpigmentation over anterolateral tongue along with BL buccal mucosa.  Cardio: +S1/S2, RRR  Resp: CTA b/l, velcro sounding breath sounds in upper lobes with decreased air entry in lower lobes BL  GI: +BS, soft, NT/ND  MSK: no synovitis, tenderness, erthyema or tenderness upon palpation of joints in UE and LE, no muscle weakness, 5/5 strength in UE and LE, heliotrope rash present around BL eyes and hyperpigmented gottrens papules over BL MCP joints with slight hyperpigmentation present over right elbow and left wrist.  Neuro: AAOx3, no focal neurological deficits  Psych: wnl    LABS:                        14.6   14.54 )-----------( 257      ( 15 Sep 2017 07:30 )             45.9     09-14    137  |  99  |  32<H>  ----------------------------<  128<H>  4.2   |  24  |  0.81    Ca    10.0      14 Sep 2017 07:09              RADIOLOGY & ADDITIONAL TESTS:    < from: Upper Endoscopy (09.14.17 @ 16:16) >   - Chronic gastritis. Biopsied.                       - LA Grade B reflux esophagitis.                       - No endoscopic esophageal abnormality to explain     patient's dysphagia. Biopsied.    < end of copied text >  < from: Colonoscopy (09.14.17 @ 16:12) >  - External thrombosed hemorrhoid, small internal                        hemorrhoids                       - Poor preparation ofcolon    < end of copied text > ALIS SARAVIA  [id number]    INTERVAL HPI/OVERNIGHT EVENTS:  post EGD, colonoscopy, no acute events  MEDICATIONS  (STANDING):  aspirin enteric coated 81 milliGRAM(s) Oral daily  trimethoprim  160 mG/sulfamethoxazole 800 mG 1 Tablet(s) Oral <User Schedule>  pantoprazole    Tablet 40 milliGRAM(s) Oral before breakfast  methylPREDNISolone sodium succinate Injectable 60 milliGRAM(s) IV Push daily  influenza   Vaccine 0.5 milliLiter(s) IntraMuscular once    MEDICATIONS  (PRN):  acetaminophen   Tablet. 650 milliGRAM(s) Oral every 6 hours PRN Mild Pain (1 - 3)  simethicone 80 milliGRAM(s) Chew every 6 hours PRN Gas  guaiFENesin    Syrup 100 milliGRAM(s) Oral every 6 hours PRN Cough  witch hazel Pads 1 Application(s) Topical four times a day PRN hemorrhoids      Allergies    No Known Allergies    Intolerances        Review of Systems:   General: No fevers/chills, no fatigue  HEENT: No blurry vision, dysphagia, or odynophagia  CVS: No CP/palpitations  Resp: SOB improving upon ambulation  GI: No N/V/C/D/abdominal pain  MSK:   Skin: No new rashes  Neuro: No headaches      Vital Signs Last 24 Hrs  T(C): 36.7 (15 Sep 2017 13:45), Max: 36.9 (14 Sep 2017 18:07)  T(F): 98 (15 Sep 2017 13:45), Max: 98.5 (14 Sep 2017 18:07)  HR: 81 (15 Sep 2017 13:45) (75 - 89)  BP: 112/71 (15 Sep 2017 13:45) (104/66 - 112/71)  BP(mean): --  RR: 18 (15 Sep 2017 13:45) (18 - 18)  SpO2: 98% (15 Sep 2017 13:45) (98% - 99%)    Physical Exam:  General: NAD, laying down comfortably, in no distress, AAOX3  HEENT: EOMI, MMM, hyperpigmentation over anterolateral tongue along with BL buccal mucosa.  Cardio: +S1/S2, RRR  Resp: CTA b/l, velcro sounding breath sounds in upper lobes with decreased air entry in lower lobes BL  GI: +BS, soft, NT/ND  MSK: no synovitis, tenderness, erthyema or tenderness upon palpation of joints in UE and LE, no muscle weakness, 5/5 strength in UE and LE, heliotrope rash present around BL eyes and hyperpigmented gottrens papules over BL MCP joints with slight hyperpigmentation present over right elbow and left wrist.  Neuro: AAOx3, no focal neurological deficits  Psych: wnl    LABS:                        14.6   14.54 )-----------( 257      ( 15 Sep 2017 07:30 )             45.9     09-14    137  |  99  |  32<H>  ----------------------------<  128<H>  4.2   |  24  |  0.81    Ca    10.0      14 Sep 2017 07:09      RADIOLOGY & ADDITIONAL TESTS:    < from: Upper Endoscopy (09.14.17 @ 16:16) >   - Chronic gastritis. Biopsied.                       - LA Grade B reflux esophagitis.                       - No endoscopic esophageal abnormality to explain     patient's dysphagia. Biopsied.    < end of copied text >  < from: Colonoscopy (09.14.17 @ 16:12) >  - External thrombosed hemorrhoid, small internal                        hemorrhoids                       - Poor preparation ofcolon    < end of copied text > ALIS SARAVIA  [id number]    INTERVAL HPI/OVERNIGHT EVENTS:  post EGD, colonoscopy, no acute events    MEDICATIONS  (STANDING):  aspirin enteric coated 81 milliGRAM(s) Oral daily  trimethoprim  160 mG/sulfamethoxazole 800 mG 1 Tablet(s) Oral <User Schedule>  pantoprazole    Tablet 40 milliGRAM(s) Oral before breakfast  methylPREDNISolone sodium succinate Injectable 60 milliGRAM(s) IV Push daily  influenza   Vaccine 0.5 milliLiter(s) IntraMuscular once    MEDICATIONS  (PRN):  acetaminophen   Tablet. 650 milliGRAM(s) Oral every 6 hours PRN Mild Pain (1 - 3)  simethicone 80 milliGRAM(s) Chew every 6 hours PRN Gas  guaiFENesin    Syrup 100 milliGRAM(s) Oral every 6 hours PRN Cough  witch hazel Pads 1 Application(s) Topical four times a day PRN hemorrhoids      Allergies    No Known Allergies    Intolerances        Review of Systems:   General: No fevers/chills, no fatigue  HEENT: No blurry vision, dysphagia, or odynophagia  CVS: No CP/palpitations  Resp: SOB improving upon ambulation  GI: No N/V/C/D/abdominal pain  MSK:   Skin: No new rashes  Neuro: No headaches      Vital Signs Last 24 Hrs  T(C): 36.7 (15 Sep 2017 13:45), Max: 36.9 (14 Sep 2017 18:07)  T(F): 98 (15 Sep 2017 13:45), Max: 98.5 (14 Sep 2017 18:07)  HR: 81 (15 Sep 2017 13:45) (75 - 89)  BP: 112/71 (15 Sep 2017 13:45) (104/66 - 112/71)  BP(mean): --  RR: 18 (15 Sep 2017 13:45) (18 - 18)  SpO2: 98% (15 Sep 2017 13:45) (98% - 99%)    Physical Exam:  General: NAD, laying down comfortably, in no distress, AAOX3  HEENT: EOMI, MMM, hyperpigmentation over anterolateral tongue along with BL buccal mucosa.  Cardio: +S1/S2, RRR  Resp: CTA b/l, velcro sounding breath sounds in upper lobes with decreased air entry in lower lobes BL  GI: +BS, soft, NT/ND  MSK: no synovitis, tenderness, erthyema or tenderness upon palpation of joints in UE and LE, no muscle weakness, 5/5 strength in UE and LE, heliotrope rash present around BL eyes and hyperpigmented gottrens papules over BL MCP joints with slight hyperpigmentation present over right elbow and left wrist.  Neuro: AAOx3, no focal neurological deficits  Psych: wnl    LABS:                        14.6   14.54 )-----------( 257      ( 15 Sep 2017 07:30 )             45.9     09-14    137  |  99  |  32<H>  ----------------------------<  128<H>  4.2   |  24  |  0.81    Ca    10.0      14 Sep 2017 07:09      RADIOLOGY & ADDITIONAL TESTS:    < from: Upper Endoscopy (09.14.17 @ 16:16) >   - Chronic gastritis. Biopsied.                       - LA Grade B reflux esophagitis.                       - No endoscopic esophageal abnormality to explain     patient's dysphagia. Biopsied.    < end of copied text >  < from: Colonoscopy (09.14.17 @ 16:12) >  - External thrombosed hemorrhoid, small internal                        hemorrhoids                       - Poor preparation ofcolon    < end of copied text >

## 2017-09-15 NOTE — CONSULT NOTE ADULT - SUBJECTIVE AND OBJECTIVE BOX
A TEAM / GENERAL SURGERY (#[id number]) CONSULT NOTE  ---------------------------------------------------------------------------------------------     HPI: Patient is a 52y old  Female who presents with a chief complaint of Shortness of breath.  Patient has been admitted to the medical service for workup, and has been found to have possible pulmonary fibrosis.  Rheumatologic workup is pending for autoimmune conditions.  Patient has also complained of sensation of food being stuck in her throat as well has rectal pain.  She is tolerating a diet, and has felt both of these symptoms intermittently for the past months to years.  She denies emesis, denies blood per rectum.  Her rectal pain only occurs when she has hard stool, but she most recently was able to have a normal bowel movement yesterday without pain.  Patient has been seen by gastroenterology, and yesterday underwent an upper endoscopy and colonoscopy.  Significant findings include gastritis, esophagitis, rectal mucosal erythema, and internal and external hemorrhoids which appeared thrombosed.  Colorectal surgery consulted as per GI recs.      Patient denies fevers/chills, denies lightheadedness/dizziness, denies SOB/chest pain, denies nausea/vomiting, denies constipation/diarrhea.     ROS: 10-system review is otherwise negative except HPI above.      PAST MEDICAL & SURGICAL HISTORY:  No pertinent past medical history  No significant past surgical history    FAMILY HISTORY:  No pertinent family history in first degree relatives    SOCIAL HISTORY:  Denies tobacco or alcohol abuse.      ALLERGIES: No Known Allergies    CURRENT MEDICATIONS  MEDICATIONS (STANDING): aspirin enteric coated 81 milliGRAM(s) Oral daily  trimethoprim  160 mG/sulfamethoxazole 800 mG 1 Tablet(s) Oral <User Schedule>  pantoprazole    Tablet 40 milliGRAM(s) Oral before breakfast  methylPREDNISolone sodium succinate Injectable 60 milliGRAM(s) IV Push daily    MEDICATIONS (PRN):acetaminophen   Tablet. 650 milliGRAM(s) Oral every 6 hours PRN Mild Pain (1 - 3)  simethicone 80 milliGRAM(s) Chew every 6 hours PRN Gas  guaiFENesin    Syrup 100 milliGRAM(s) Oral every 6 hours PRN Cough    --------------------------------------------------------------------------------------------    Vitals:   T(C): 36.6 (09-15-17 @ 05:10), Max: 36.9 (09-14-17 @ 13:32)  HR: 80 (09-15-17 @ 05:10) (75 - 89)  BP: 109/65 (09-15-17 @ 05:10) (104/66 - 111/80)  BP(mean): --  RR: 18 (09-15-17 @ 05:10) (17 - 18)  SpO2: 98% (09-15-17 @ 05:10) (97% - 99%)  Wt(kg): --  CAPILLARY BLOOD GLUCOSE          Height (cm): 157.48 (09-14 @ 08:00)  Weight (kg): 60.8 (09-14 @ 08:00)  BMI (kg/m2): 24.5 (09-14 @ 08:00)  BSA (m2): 1.61 (09-14 @ 08:00)    PHYSICAL EXAM:   General: NAD, Lying in bed comfortably  Neuro: A+Ox3  Cardio: RRR, nml S1/S2  Resp: Good effort, CTA b/l  GI/Abd: Soft, NT/ND, no rebound/guarding, no masses palpated, no scars  Rectal: external hemorrhoids on left, no active bleeding, minimally tender, good sphincter tone,   Vascular: All 4 extremities warm.  Musculoskeletal: All 4 extremities moving spontaneously, no limitations    --------------------------------------------------------------------------------------------    LABS  CBC (09-15 @ 07:30)                              14.6                           14.54<H>  )----------------(  257        --    % Neutrophils, --    % Lymphocytes, ANC: --                                  45.9<H>  CBC (09-14 @ 07:09)                              14.8                           13.81<H>  )----------------(  243        --    % Neutrophils, --    % Lymphocytes, ANC: --                                  46.6<H>    BMP (09-14 @ 07:09)             137     |  99      |  32<H> 		Ca++ --      Ca 10.0               ---------------------------------( 128<H>		Mg --                 4.2     |  24      |  0.81  			Ph --            --------------------------------------------------------------------------------------------    IMAGING  < from: CT Abdomen and Pelvis w/ IV Cont (09.10.17 @ 13:34) >  IMPRESSION:   No intra-abdominal mass.  Redemonstration of bilateral lower lobe findings consistent with   interstitial lung disease and pulmonary fibrosis.  < end of copied text >    < from: MRI Abdomen w/wo Cont (09.13.17 @ 21:54) >  IMPRESSION:   No liver lesion. The finding identified on recent CT in the left hepatic   lobe was artifactual and likely related to summation of adjacent vascular   structures.  < end of copied text >    < from: Upper Endoscopy (09.14.17 @ 16:16) >  Impression:          - Chronic gastritis. Biopsied.                       - LA Grade B reflux esophagitis.                       - No endoscopic esophageal abnormality to explain     < end of copied text >    < from: Colonoscopy (09.14.17 @ 16:12) >  Impression:          - External thrombosed hemorrhoid, small internal                        hemorrhoids                       - Poor preparation ofcolon    < end of copied text >    --------------------------------------------------------------------------------------------    ASSESSMENT: Patient is a 52y old f with internal and external hemorrhoids, intermittently symptomatic.      PLAN:    - No acute inpatient surgical intervention indicated  - Rec. outpatient follow up with Dr. Varghese  - Care as per medicine.   - Will d/w attending.     Paulina Nowak MD PGY3  A Team Surgery, #[id number]

## 2017-09-18 LAB — SURGICAL PATHOLOGY STUDY: SIGNIFICANT CHANGE UP

## 2017-09-25 ENCOUNTER — OUTPATIENT (OUTPATIENT)
Dept: OUTPATIENT SERVICES | Facility: HOSPITAL | Age: 52
LOS: 1 days | End: 2017-09-25

## 2017-09-25 ENCOUNTER — APPOINTMENT (OUTPATIENT)
Dept: RHEUMATOLOGY | Facility: HOSPITAL | Age: 52
End: 2017-09-25

## 2017-09-25 VITALS
HEART RATE: 88 BPM | SYSTOLIC BLOOD PRESSURE: 110 MMHG | HEIGHT: 62 IN | DIASTOLIC BLOOD PRESSURE: 84 MMHG | WEIGHT: 134 LBS | BODY MASS INDEX: 24.66 KG/M2

## 2017-09-25 DIAGNOSIS — Z79.52 LONG TERM (CURRENT) USE OF SYSTEMIC STEROIDS: ICD-10-CM

## 2017-09-25 DIAGNOSIS — Z79.899 OTHER LONG TERM (CURRENT) DRUG THERAPY: ICD-10-CM

## 2017-09-25 DIAGNOSIS — M33.90 DERMATOPOLYMYOSITIS, UNSPECIFIED, ORGAN INVOLVEMENT UNSPECIFIED: ICD-10-CM

## 2017-09-25 DIAGNOSIS — J84.9 INTERSTITIAL PULMONARY DISEASE, UNSPECIFIED: ICD-10-CM

## 2017-09-25 RX ORDER — PREDNISONE 10 MG/1
10 TABLET ORAL
Refills: 0 | Status: ACTIVE | COMMUNITY
Start: 2017-09-25

## 2017-09-25 RX ORDER — OMEPRAZOLE 40 MG/1
40 CAPSULE, DELAYED RELEASE ORAL
Refills: 0 | Status: ACTIVE | COMMUNITY
Start: 2017-09-25

## 2017-09-25 RX ORDER — SULFAMETHOXAZOLE AND TRIMETHOPRIM 800; 160 MG/1; MG/1
800-160 TABLET ORAL
Refills: 0 | Status: ACTIVE | COMMUNITY
Start: 2017-09-25

## 2017-09-25 RX ORDER — ACETAMINOPHEN 500 MG/1
500 TABLET, COATED ORAL
Refills: 0 | Status: ACTIVE | COMMUNITY
Start: 2017-09-25

## 2017-09-25 RX ORDER — MYCOPHENOLATE MOFETIL 500 MG/1
500 TABLET ORAL
Qty: 50 | Refills: 0 | Status: ACTIVE | COMMUNITY
Start: 2017-09-25 | End: 1900-01-01

## 2017-09-25 RX ORDER — ASPIRIN ENTERIC COATED TABLETS 81 MG 81 MG/1
81 TABLET, DELAYED RELEASE ORAL
Refills: 0 | Status: ACTIVE | COMMUNITY
Start: 2017-09-25

## 2017-09-26 PROBLEM — Z79.52 CURRENT CHRONIC USE OF SYSTEMIC STEROIDS: Status: ACTIVE | Noted: 2017-09-26

## 2017-09-27 ENCOUNTER — APPOINTMENT (OUTPATIENT)
Dept: PULMONOLOGY | Facility: CLINIC | Age: 52
End: 2017-09-27
Payer: MEDICAID

## 2017-09-27 VITALS
DIASTOLIC BLOOD PRESSURE: 90 MMHG | RESPIRATION RATE: 20 BRPM | HEART RATE: 72 BPM | HEIGHT: 62 IN | BODY MASS INDEX: 23.92 KG/M2 | TEMPERATURE: 96.6 F | WEIGHT: 130 LBS | SYSTOLIC BLOOD PRESSURE: 120 MMHG | OXYGEN SATURATION: 98 %

## 2017-09-27 PROCEDURE — 94729 DIFFUSING CAPACITY: CPT | Mod: GC

## 2017-09-27 PROCEDURE — ZZZZZ: CPT

## 2017-09-27 PROCEDURE — 94620 PULMONARY STRESS TESTING SIMPLE: CPT | Mod: GC

## 2017-09-27 PROCEDURE — 99213 OFFICE O/P EST LOW 20 MIN: CPT | Mod: 25,GC

## 2017-09-27 PROCEDURE — 94010 BREATHING CAPACITY TEST: CPT | Mod: 59

## 2017-09-27 PROCEDURE — 94726 PLETHYSMOGRAPHY LUNG VOLUMES: CPT | Mod: GC

## 2017-09-29 LAB — MISCELLANEOUS - CHEM: SIGNIFICANT CHANGE UP

## 2017-10-09 ENCOUNTER — APPOINTMENT (OUTPATIENT)
Dept: RHEUMATOLOGY | Facility: HOSPITAL | Age: 52
End: 2017-10-09
Payer: MEDICAID

## 2017-10-09 ENCOUNTER — OUTPATIENT (OUTPATIENT)
Dept: OUTPATIENT SERVICES | Facility: HOSPITAL | Age: 52
LOS: 1 days | End: 2017-10-09

## 2017-10-09 ENCOUNTER — LABORATORY RESULT (OUTPATIENT)
Age: 52
End: 2017-10-09

## 2017-10-09 ENCOUNTER — APPOINTMENT (OUTPATIENT)
Dept: INTERNAL MEDICINE | Facility: HOSPITAL | Age: 52
End: 2017-10-09
Payer: MEDICAID

## 2017-10-09 VITALS
HEART RATE: 96 BPM | WEIGHT: 134 LBS | HEIGHT: 62 IN | SYSTOLIC BLOOD PRESSURE: 136 MMHG | DIASTOLIC BLOOD PRESSURE: 97 MMHG | BODY MASS INDEX: 24.66 KG/M2

## 2017-10-09 VITALS — HEIGHT: 62 IN | BODY MASS INDEX: 24.48 KG/M2 | WEIGHT: 133 LBS

## 2017-10-09 VITALS — SYSTOLIC BLOOD PRESSURE: 120 MMHG | DIASTOLIC BLOOD PRESSURE: 85 MMHG

## 2017-10-09 VITALS — OXYGEN SATURATION: 94 % | RESPIRATION RATE: 16 BRPM

## 2017-10-09 DIAGNOSIS — M35.8 OTHER SPECIFIED SYSTEMIC INVOLVEMENT OF CONNECTIVE TISSUE: ICD-10-CM

## 2017-10-09 DIAGNOSIS — M33.90 DERMATOPOLYMYOSITIS, UNSPECIFIED, ORGAN INVOLVEMENT UNSPECIFIED: ICD-10-CM

## 2017-10-09 LAB
BASOPHILS # BLD AUTO: 0.02 K/UL — SIGNIFICANT CHANGE UP (ref 0–0.2)
BASOPHILS NFR BLD AUTO: 0.2 % — SIGNIFICANT CHANGE UP (ref 0–2)
BUN SERPL-MCNC: 16 MG/DL — SIGNIFICANT CHANGE UP (ref 7–23)
CALCIUM SERPL-MCNC: 9.6 MG/DL — SIGNIFICANT CHANGE UP (ref 8.4–10.5)
CHLORIDE SERPL-SCNC: 100 MMOL/L — SIGNIFICANT CHANGE UP (ref 98–107)
CHOLEST SERPL-MCNC: 325 MG/DL — HIGH (ref 120–199)
CO2 SERPL-SCNC: 27 MMOL/L — SIGNIFICANT CHANGE UP (ref 22–31)
CREAT SERPL-MCNC: 0.65 MG/DL — SIGNIFICANT CHANGE UP (ref 0.5–1.3)
EOSINOPHIL # BLD AUTO: 0 K/UL — SIGNIFICANT CHANGE UP (ref 0–0.5)
EOSINOPHIL NFR BLD AUTO: 0 % — SIGNIFICANT CHANGE UP (ref 0–6)
GLUCOSE SERPL-MCNC: 123 MG/DL — HIGH (ref 70–99)
HBA1C BLD-MCNC: 6.3 % — HIGH (ref 4–5.6)
HCT VFR BLD CALC: 46.1 % — HIGH (ref 34.5–45)
HDLC SERPL-MCNC: 44 MG/DL — LOW (ref 45–65)
HGB BLD-MCNC: 14.6 G/DL — SIGNIFICANT CHANGE UP (ref 11.5–15.5)
IMM GRANULOCYTES # BLD AUTO: 0.04 # — SIGNIFICANT CHANGE UP
IMM GRANULOCYTES NFR BLD AUTO: 0.4 % — SIGNIFICANT CHANGE UP (ref 0–1.5)
LIPID PNL WITH DIRECT LDL SERPL: 230 MG/DL — SIGNIFICANT CHANGE UP
LYMPHOCYTES # BLD AUTO: 2.38 K/UL — SIGNIFICANT CHANGE UP (ref 1–3.3)
LYMPHOCYTES # BLD AUTO: 23.1 % — SIGNIFICANT CHANGE UP (ref 13–44)
MCHC RBC-ENTMCNC: 26.3 PG — LOW (ref 27–34)
MCHC RBC-ENTMCNC: 31.7 % — LOW (ref 32–36)
MCV RBC AUTO: 82.9 FL — SIGNIFICANT CHANGE UP (ref 80–100)
MONOCYTES # BLD AUTO: 0.67 K/UL — SIGNIFICANT CHANGE UP (ref 0–0.9)
MONOCYTES NFR BLD AUTO: 6.5 % — SIGNIFICANT CHANGE UP (ref 2–14)
NEUTROPHILS # BLD AUTO: 7.21 K/UL — SIGNIFICANT CHANGE UP (ref 1.8–7.4)
NEUTROPHILS NFR BLD AUTO: 69.8 % — SIGNIFICANT CHANGE UP (ref 43–77)
NRBC # FLD: 0 — SIGNIFICANT CHANGE UP
PLATELET # BLD AUTO: 220 K/UL — SIGNIFICANT CHANGE UP (ref 150–400)
PMV BLD: 10.9 FL — SIGNIFICANT CHANGE UP (ref 7–13)
POTASSIUM SERPL-MCNC: 3.8 MMOL/L — SIGNIFICANT CHANGE UP (ref 3.5–5.3)
POTASSIUM SERPL-SCNC: 3.8 MMOL/L — SIGNIFICANT CHANGE UP (ref 3.5–5.3)
RBC # BLD: 5.56 M/UL — HIGH (ref 3.8–5.2)
RBC # FLD: 14.2 % — SIGNIFICANT CHANGE UP (ref 10.3–14.5)
SODIUM SERPL-SCNC: 142 MMOL/L — SIGNIFICANT CHANGE UP (ref 135–145)
TRIGL SERPL-MCNC: 499 MG/DL — HIGH (ref 10–149)
WBC # BLD: 10.32 K/UL — SIGNIFICANT CHANGE UP (ref 3.8–10.5)
WBC # FLD AUTO: 10.32 K/UL — SIGNIFICANT CHANGE UP (ref 3.8–10.5)

## 2017-10-09 PROCEDURE — 99214 OFFICE O/P EST MOD 30 MIN: CPT | Mod: GC

## 2017-10-23 DIAGNOSIS — M35.8 OTHER SPECIFIED SYSTEMIC INVOLVEMENT OF CONNECTIVE TISSUE: ICD-10-CM

## 2017-10-23 DIAGNOSIS — M33.90 DERMATOPOLYMYOSITIS, UNSPECIFIED, ORGAN INVOLVEMENT UNSPECIFIED: ICD-10-CM

## 2017-10-30 ENCOUNTER — APPOINTMENT (OUTPATIENT)
Dept: RHEUMATOLOGY | Facility: HOSPITAL | Age: 52
End: 2017-10-30

## 2017-11-01 ENCOUNTER — APPOINTMENT (OUTPATIENT)
Dept: PULMONOLOGY | Facility: CLINIC | Age: 52
End: 2017-11-01
Payer: MEDICAID

## 2017-11-01 VITALS
TEMPERATURE: 98.1 F | RESPIRATION RATE: 18 BRPM | BODY MASS INDEX: 24.84 KG/M2 | HEIGHT: 62 IN | DIASTOLIC BLOOD PRESSURE: 88 MMHG | HEART RATE: 104 BPM | SYSTOLIC BLOOD PRESSURE: 140 MMHG | WEIGHT: 135 LBS

## 2017-11-01 DIAGNOSIS — J84.89 OTHER SPECIFIED SYSTEMIC INVOLVEMENT OF CONNECTIVE TISSUE: ICD-10-CM

## 2017-11-01 DIAGNOSIS — M33.90 DERMATOPOLYMYOSITIS, UNSPECIFIED, ORGAN INVOLVEMENT UNSPECIFIED: ICD-10-CM

## 2017-11-01 DIAGNOSIS — M35.8 OTHER SPECIFIED SYSTEMIC INVOLVEMENT OF CONNECTIVE TISSUE: ICD-10-CM

## 2017-11-01 PROCEDURE — 99213 OFFICE O/P EST LOW 20 MIN: CPT | Mod: GC

## 2017-11-16 ENCOUNTER — APPOINTMENT (OUTPATIENT)
Dept: INTERNAL MEDICINE | Facility: HOSPITAL | Age: 52
End: 2017-11-16

## 2017-12-04 ENCOUNTER — APPOINTMENT (OUTPATIENT)
Dept: RHEUMATOLOGY | Facility: HOSPITAL | Age: 52
End: 2017-12-04

## 2018-01-10 ENCOUNTER — APPOINTMENT (OUTPATIENT)
Dept: PULMONOLOGY | Facility: CLINIC | Age: 53
End: 2018-01-10

## 2018-01-22 ENCOUNTER — APPOINTMENT (OUTPATIENT)
Dept: RHEUMATOLOGY | Facility: HOSPITAL | Age: 53
End: 2018-01-22

## 2018-02-07 ENCOUNTER — APPOINTMENT (OUTPATIENT)
Dept: PULMONOLOGY | Facility: CLINIC | Age: 53
End: 2018-02-07

## 2018-02-22 NOTE — DISCHARGE NOTE ADULT - CASE MANAGER'S NAME
I called Ml Cagle and reminded her of her upcoming appointment. I informed Ml Cagle to arrive at least 15 minutes early and bring any outside records.     ROXANNE Henao      
Edil Matos RN,

## 2018-04-18 ENCOUNTER — APPOINTMENT (OUTPATIENT)
Dept: PULMONOLOGY | Facility: CLINIC | Age: 53
End: 2018-04-18

## 2018-04-19 ENCOUNTER — APPOINTMENT (OUTPATIENT)
Dept: INTERNAL MEDICINE | Facility: HOSPITAL | Age: 53
End: 2018-04-19

## 2018-04-23 ENCOUNTER — APPOINTMENT (OUTPATIENT)
Dept: RHEUMATOLOGY | Facility: HOSPITAL | Age: 53
End: 2018-04-23

## 2018-06-30 NOTE — CONSULT NOTE ADULT - CONSULT REQUESTED BY NAME
TRANSFER - IN REPORT:    Verbal report received from Jeffry Curtis RN(name) on [facility]   being received from ED(unit) for routine progression of care      Report consisted of patients Situation, Background, Assessment and   Recommendations(SBAR). Information from the following report(s) SBAR, ED Summary, [facility] and Recent Results was reviewed with the receiving nurse. Opportunity for questions and clarification was provided. Assessment completed upon patients arrival to unit and care assumed. Dr. Nowak (gastroenterology)

## 2019-10-10 NOTE — PRE-OP CHECKLIST - AS BP NONINV METHOD
[FreeTextEntry1] : 69 year-old female with HTN, thyroid? presents for evaluation of CP. Patient reports that for one year she felt substernal CP not related to exertion lasting minutes. Patient has seen Dr. Stiven Avila in June 2018 who noted that patient had blockage. She is on Plavix 75 mg. Patient reports SOB with exertion. She reports chest congestion and wants to cough. Patient reports palpitations. Patient denies h/o syncope. Patient reports dizziness. I advised patient to wear a Holter monitor. I advised patient to undergo an echocardiogram.  electronic

## 2019-12-16 NOTE — ED ADULT NURSE NOTE - OBJECTIVE STATEMENT
[de-identified] : Ms Graham has had persistent pain and swelling in her left knee. She will be sent for an MRI to r.o a medial meniscus tear.  We will call her with the results. All questions were answered. She will call if any issues arise. 
Ambulatory complaining of SOB, NAD noted, ambulates with steady gait to bedside. Chest expansion symmetrical. Skin color normal for patient, no accessory muscle use or nasal flaring. Patient states that on and off for the passed few weeks she has had increased PYLE. Patient comfortable in bed at this time. Denies fever/chills, SOB at present, diaphoresis. Continuous cardiac monitoring in place. Safety maintained, needs attended. Will continue to monitor.

## 2020-01-31 NOTE — PATIENT PROFILE ADULT. - NS TRANSFER PATIENT BELONGINGS
Patient gives consent to share procedure, information, and instructions with NASRIN Jacobsen.   
Clothing/Cell Phone/PDA (specify)

## 2021-03-25 NOTE — PROGRESS NOTE ADULT - GASTROINTESTINAL
done
detailed exam
Soft, non-tender, no hepatosplenomegaly, normal bowel sounds
Soft, non-tender, no hepatosplenomegaly, normal bowel sounds
detailed exam
Soft, non-tender, no hepatosplenomegaly, normal bowel sounds
detailed exam
detailed exam

## 2023-05-30 NOTE — DISCHARGE NOTE ADULT - PROVIDER TOKENS
TOKEN:'[id number]',TOKEN:'[id number]:'[id number]' Ilumya Counseling: I discussed with the patient the risks of tildrakizumab including but not limited to immunosuppression, malignancy, posterior leukoencephalopathy syndrome, and serious infections.  The patient understands that monitoring is required including a PPD at baseline and must alert us or the primary physician if symptoms of infection or other concerning signs are noted.

## 2023-08-08 NOTE — SWALLOW VFSS/MBS ASSESSMENT ADULT - DIAGNOSTIC IMPRESSIONS
PEM ATTENDING ADDENDUM   I personally performed a history and physical examination, and discussed the management with the trainee.  The past medical and surgical history, review of systems, family history, social history, current medications, allergies, and immunization status were discussed with the trainee and I confirmed pertinent portions with the patient and/or family. I reviewed the assessment and plan documented by the trainee. I made modifications to the documentation above as I felt appropriate, and concur with what is documented above unless otherwise noted below.  I personally reviewed the diagnostic studies obtained.    Gael Figueroa MD 1. The patient demonstrated functional oral management of puree, solid, and nectar thick liquid textures marked by adequate bolus collection, transfer, and AP transit.   2. The patient demonstrated a mild oral dysphagia for thin liquids marked by delayed bolus collection and transfer with uncontrolled spillover to the hypopharynx.   3. The patient demonstrated a mild pharyngeal dysphagia for puree, solids, and nectar thick liquid textures marked by delayed pharyngeal swallow trigger with adequate base of tongue retraction, reduced epiglottic deflection, adequate hyolaryngeal elevation, and adequate pharyngeal contractility. Trace stasis noted along the base of tongue and in the vallecula which cleared with a spontaneous re-swallow. No laryngeal penetration/aspiration.   4. The patient demonstrated a mild-moderate pharyngeal dysphagia for thin liquid textures marked by delayed pharyngeal swallow trigger with adequate base of tongue retraction, reduced epiglottic deflection, reduced hyolaryngeal elevation, and adequate pharyngeal contractility. Trace laryngeal penetration with full retrieval observed on larger, consecutive cup sips of thin liquids. Laryngeal penetration was not reduplicated when the patient consumed small, single cup sips of thin liquids. 1. The patient demonstrated functional oral management of puree, solid, and nectar thick liquid textures marked by adequate bolus collection, transfer, and AP transit.   2. The patient demonstrated a mild oral dysphagia for thin liquids marked by delayed bolus collection and transfer with uncontrolled spillover to the hypopharynx.   3. The patient demonstrated a mild pharyngeal dysphagia for puree, solids, and nectar thick liquid textures marked by delayed pharyngeal swallow trigger with adequate base of tongue retraction, reduced epiglottic deflection, adequate hyolaryngeal elevation, and adequate pharyngeal contractility. Trace stasis noted along the base of tongue and in the vallecula which cleared with a spontaneous re-swallow. No laryngeal penetration/aspiration.   4. The patient demonstrated a mild-moderate pharyngeal dysphagia for thin liquid textures marked by delayed pharyngeal swallow trigger with adequate base of tongue retraction, reduced epiglottic deflection, reduced hyolaryngeal elevation, and adequate pharyngeal contractility. Trace laryngeal penetration with full retrieval observed on larger, consecutive cup sips of thin liquids. Laryngeal penetration was not reduplicated when the patient consumed small, single cup sips of thin liquids. Trace stasis noted along the base of tongue and in the vallecula which cleared with spontaneous re-swallows.

## 2025-07-06 NOTE — PROGRESS NOTE ADULT - ASSESSMENT
quality management 52 year old female with no significant PMH who comes in for SOB and was found to have interstitial lung disease found on Ct chest in addition to elevated CPK and rash suspicious for Dermatomyositis. Patient with rash consistent with dermatomyositis (heliotrope rash and Gottron's papules), interstitial lung disease, dysphagia and incidentally found "Small calcified granulomas. No mass visualized" Speech and swallow obtained with normal findings.  r/o esophageal mass